# Patient Record
Sex: FEMALE | Race: WHITE | Employment: FULL TIME | URBAN - METROPOLITAN AREA
[De-identification: names, ages, dates, MRNs, and addresses within clinical notes are randomized per-mention and may not be internally consistent; named-entity substitution may affect disease eponyms.]

---

## 2021-02-19 ENCOUNTER — HOSPITAL ENCOUNTER (OUTPATIENT)
Dept: WOMENS IMAGING | Age: 41
Discharge: HOME | End: 2021-02-21
Payer: COMMERCIAL

## 2021-02-19 DIAGNOSIS — Z12.31 OTHER SCREENING MAMMOGRAM: ICD-10-CM

## 2021-02-19 PROCEDURE — 77067 SCR MAMMO BI INCL CAD: CPT

## 2023-09-15 LAB
6-ACETYLMORPHINE: <25 NG/ML
7-AMINOCLONAZEPAM: <25 NG/ML
ALPHA-HYDROXYALPRAZOLAM: <25 NG/ML
ALPHA-HYDROXYMIDAZOLAM: <25 NG/ML
ALPRAZOLAM: <25 NG/ML
AMPHETAMINE (PRESENCE) IN URINE BY SCREEN METHOD: ABNORMAL
BARBITURATES PRESENCE IN URINE BY SCREEN METHOD: ABNORMAL
CANNABINOIDS IN URINE BY SCREEN METHOD: ABNORMAL
CHLORDIAZEPOXIDE: <25 NG/ML
CLONAZEPAM: <25 NG/ML
COCAINE (PRESENCE) IN URINE BY SCREEN METHOD: ABNORMAL
CODEINE: <50 NG/ML
CREATINE, URINE FOR DRUG: 87.4 MG/DL
DIAZEPAM: <25 NG/ML
DRUG SCREEN COMMENT URINE: ABNORMAL
EDDP: <25 NG/ML
FENTANYL CONFIRMATION, URINE: <2.5 NG/ML
HYDROCODONE: <25 NG/ML
HYDROMORPHONE: <25 NG/ML
LORAZEPAM: <25 NG/ML
METHADONE CONFIRMATION,URINE: <25 NG/ML
MIDAZOLAM: <25 NG/ML
MORPHINE URINE: <50 NG/ML
NORDIAZEPAM: <25 NG/ML
NORFENTANYL: <2.5 NG/ML
NORHYDROCODONE: <25 NG/ML
NOROXYCODONE: <25 NG/ML
O-DESMETHYLTRAMADOL: <50 NG/ML
OXAZEPAM: <25 NG/ML
OXYCODONE: <25 NG/ML
OXYMORPHONE: <25 NG/ML
PHENCYCLIDINE (PRESENCE) IN URINE BY SCREEN METHOD: ABNORMAL
TEMAZEPAM: <25 NG/ML
TRAMADOL: <50 NG/ML
ZOLPIDEM METABOLITE (ZCA): <25 NG/ML
ZOLPIDEM: <25 NG/ML

## 2023-09-16 LAB
AMPHETAMINES,URINE: 3109 NG/ML
MDA,URINE: <200 NG/ML
MDEA,URINE: <200 NG/ML
MDMA,UR: <200 NG/ML
METHAMPHETAMINE QUANTITATIVE URINE: <200 NG/ML
PHENTERMINE,UR: <200 NG/ML

## 2023-10-20 ENCOUNTER — TELEPHONE (OUTPATIENT)
Dept: PRIMARY CARE | Facility: CLINIC | Age: 43
End: 2023-10-20
Payer: COMMERCIAL

## 2023-10-20 DIAGNOSIS — F90.9 ATTENTION DEFICIT HYPERACTIVITY DISORDER (ADHD), UNSPECIFIED ADHD TYPE: Primary | ICD-10-CM

## 2023-10-20 RX ORDER — DEXTROAMPHETAMINE SACCHARATE, AMPHETAMINE ASPARTATE MONOHYDRATE, DEXTROAMPHETAMINE SULFATE AND AMPHETAMINE SULFATE 6.25; 6.25; 6.25; 6.25 MG/1; MG/1; MG/1; MG/1
25 CAPSULE, EXTENDED RELEASE ORAL EVERY MORNING
Qty: 30 CAPSULE | Refills: 0 | Status: SHIPPED | OUTPATIENT
Start: 2023-11-19 | End: 2023-12-21 | Stop reason: SDUPTHER

## 2023-10-20 RX ORDER — DEXTROAMPHETAMINE SACCHARATE, AMPHETAMINE ASPARTATE MONOHYDRATE, DEXTROAMPHETAMINE SULFATE AND AMPHETAMINE SULFATE 6.25; 6.25; 6.25; 6.25 MG/1; MG/1; MG/1; MG/1
25 CAPSULE, EXTENDED RELEASE ORAL EVERY MORNING
Qty: 30 CAPSULE | Refills: 0 | Status: SHIPPED | OUTPATIENT
Start: 2023-10-20 | End: 2023-12-21 | Stop reason: WASHOUT

## 2023-10-20 NOTE — TELEPHONE ENCOUNTER
Rx Refill Request Telephone Encounter    Name:  Christine Miller  :  [id number]  Medication Name:  Adderall 25 mg   Specific Pharmacy location:   [facility]   Date of last appointment:  23  Date of next appointment:  doesn't have one   Best number to reach patient:    [phone]

## 2023-10-23 ENCOUNTER — TELEPHONE (OUTPATIENT)
Dept: PRIMARY CARE | Facility: CLINIC | Age: 43
End: 2023-10-23
Payer: COMMERCIAL

## 2023-10-23 NOTE — TELEPHONE ENCOUNTER
Rx Refill Request Telephone Encounter    Name:  Christine Miller  :  [id number]  Medication Name:  adderall  25mg  By mouth  90      Specific Pharmacy location:  [facility]  Date of last appointment:    Date of next appointment:  no appt  Best number to reach patient:  [phone]

## 2023-11-11 DIAGNOSIS — Z30.41 ORAL CONTRACEPTIVE PILL SURVEILLANCE: Primary | ICD-10-CM

## 2023-11-14 RX ORDER — DROSPIRENONE AND ETHINYL ESTRADIOL 0.02-3(28)
1 KIT ORAL DAILY
Qty: 84 TABLET | Refills: 0 | Status: SHIPPED | OUTPATIENT
Start: 2023-11-14 | End: 2024-02-16 | Stop reason: SDUPTHER

## 2023-11-20 ENCOUNTER — TELEPHONE (OUTPATIENT)
Dept: PRIMARY CARE | Facility: CLINIC | Age: 43
End: 2023-11-20
Payer: COMMERCIAL

## 2023-11-20 NOTE — TELEPHONE ENCOUNTER
Rx Refill Request Telephone Encounter    Name:  Christine Miller  :  [id number]  Medication Name:  Adderal  Specific Pharmacy location: [facility]  Date of last appointment:    Date of next appointment:    Best number to reach patient:

## 2023-12-17 DIAGNOSIS — E78.5 HYPERLIPIDEMIA, UNSPECIFIED HYPERLIPIDEMIA TYPE: ICD-10-CM

## 2023-12-17 DIAGNOSIS — F41.8 DEPRESSION WITH ANXIETY: ICD-10-CM

## 2023-12-17 DIAGNOSIS — I10 BENIGN ESSENTIAL HYPERTENSION: ICD-10-CM

## 2023-12-17 DIAGNOSIS — K21.9 GASTROESOPHAGEAL REFLUX DISEASE, UNSPECIFIED WHETHER ESOPHAGITIS PRESENT: ICD-10-CM

## 2023-12-19 RX ORDER — AMLODIPINE BESYLATE 5 MG/1
5 TABLET ORAL DAILY
Qty: 90 TABLET | Refills: 0 | Status: SHIPPED | OUTPATIENT
Start: 2023-12-19 | End: 2024-03-21

## 2023-12-19 RX ORDER — FLUOXETINE HYDROCHLORIDE 40 MG/1
80 CAPSULE ORAL DAILY
Qty: 180 CAPSULE | Refills: 0 | Status: SHIPPED | OUTPATIENT
Start: 2023-12-19 | End: 2024-03-21

## 2023-12-19 RX ORDER — PANTOPRAZOLE SODIUM 20 MG/1
20 TABLET, DELAYED RELEASE ORAL DAILY
Qty: 90 TABLET | Refills: 0 | Status: SHIPPED | OUTPATIENT
Start: 2023-12-19 | End: 2024-03-21

## 2023-12-19 RX ORDER — FENOFIBRATE 145 MG/1
145 TABLET, FILM COATED ORAL DAILY
Qty: 90 TABLET | Refills: 0 | Status: SHIPPED | OUTPATIENT
Start: 2023-12-19 | End: 2024-03-21

## 2023-12-21 ENCOUNTER — OFFICE VISIT (OUTPATIENT)
Dept: PRIMARY CARE | Facility: CLINIC | Age: 43
End: 2023-12-21
Payer: COMMERCIAL

## 2023-12-21 VITALS
SYSTOLIC BLOOD PRESSURE: 128 MMHG | HEIGHT: 64 IN | WEIGHT: 200 LBS | BODY MASS INDEX: 34.15 KG/M2 | DIASTOLIC BLOOD PRESSURE: 78 MMHG | TEMPERATURE: 98.1 F | HEART RATE: 89 BPM

## 2023-12-21 DIAGNOSIS — I10 BENIGN ESSENTIAL HYPERTENSION: ICD-10-CM

## 2023-12-21 DIAGNOSIS — F41.8 DEPRESSION WITH ANXIETY: ICD-10-CM

## 2023-12-21 DIAGNOSIS — F90.9 ATTENTION DEFICIT HYPERACTIVITY DISORDER (ADHD), UNSPECIFIED ADHD TYPE: ICD-10-CM

## 2023-12-21 DIAGNOSIS — F98.8 ATTENTION DEFICIT DISORDER, UNSPECIFIED HYPERACTIVITY PRESENCE: Primary | ICD-10-CM

## 2023-12-21 DIAGNOSIS — E78.2 MIXED HYPERLIPIDEMIA: ICD-10-CM

## 2023-12-21 DIAGNOSIS — R79.89 ABNORMAL TSH: ICD-10-CM

## 2023-12-21 DIAGNOSIS — E66.09 CLASS 1 OBESITY DUE TO EXCESS CALORIES WITH SERIOUS COMORBIDITY AND BODY MASS INDEX (BMI) OF 34.0 TO 34.9 IN ADULT: ICD-10-CM

## 2023-12-21 DIAGNOSIS — R73.9 HYPERGLYCEMIA: ICD-10-CM

## 2023-12-21 PROBLEM — N92.0 MENORRHAGIA: Status: ACTIVE | Noted: 2023-12-21

## 2023-12-21 PROBLEM — R53.83 FATIGUE: Status: ACTIVE | Noted: 2023-12-21

## 2023-12-21 PROBLEM — E88.810 METABOLIC SYNDROME X: Status: ACTIVE | Noted: 2023-12-21

## 2023-12-21 PROBLEM — J39.2 HYPERACTIVE GAG REFLEX: Status: ACTIVE | Noted: 2023-12-21

## 2023-12-21 PROBLEM — K21.9 ESOPHAGEAL REFLUX: Status: ACTIVE | Noted: 2023-12-21

## 2023-12-21 PROBLEM — E55.9 VITAMIN D DEFICIENCY: Status: ACTIVE | Noted: 2023-12-21

## 2023-12-21 PROBLEM — G47.33 OBSTRUCTIVE SLEEP APNEA (ADULT) (PEDIATRIC): Status: ACTIVE | Noted: 2021-08-09

## 2023-12-21 PROBLEM — R00.0 TACHYCARDIA: Status: ACTIVE | Noted: 2023-12-21

## 2023-12-21 PROBLEM — N92.6 IRREGULAR MENSTRUAL CYCLE: Status: ACTIVE | Noted: 2023-12-21

## 2023-12-21 PROBLEM — E28.2 POLYCYSTIC OVARY SYNDROME: Status: ACTIVE | Noted: 2020-05-12

## 2023-12-21 PROBLEM — J30.9 ACUTE ALLERGIC RHINITIS: Status: ACTIVE | Noted: 2023-12-21

## 2023-12-21 PROBLEM — E66.811 CLASS 1 OBESITY DUE TO EXCESS CALORIES WITH SERIOUS COMORBIDITY AND BODY MASS INDEX (BMI) OF 34.0 TO 34.9 IN ADULT: Status: ACTIVE | Noted: 2023-12-21

## 2023-12-21 PROBLEM — K62.5 RECTAL BLEEDING: Status: ACTIVE | Noted: 2023-12-21

## 2023-12-21 PROBLEM — U07.1 COVID-19: Status: RESOLVED | Noted: 2023-12-21 | Resolved: 2023-12-21

## 2023-12-21 PROCEDURE — 1036F TOBACCO NON-USER: CPT | Performed by: FAMILY MEDICINE

## 2023-12-21 PROCEDURE — 99214 OFFICE O/P EST MOD 30 MIN: CPT | Performed by: FAMILY MEDICINE

## 2023-12-21 PROCEDURE — 3074F SYST BP LT 130 MM HG: CPT | Performed by: FAMILY MEDICINE

## 2023-12-21 PROCEDURE — 3008F BODY MASS INDEX DOCD: CPT | Performed by: FAMILY MEDICINE

## 2023-12-21 PROCEDURE — 3078F DIAST BP <80 MM HG: CPT | Performed by: FAMILY MEDICINE

## 2023-12-21 RX ORDER — DEXTROAMPHETAMINE SACCHARATE, AMPHETAMINE ASPARTATE MONOHYDRATE, DEXTROAMPHETAMINE SULFATE AND AMPHETAMINE SULFATE 6.25; 6.25; 6.25; 6.25 MG/1; MG/1; MG/1; MG/1
25 CAPSULE, EXTENDED RELEASE ORAL EVERY MORNING
Qty: 30 CAPSULE | Refills: 0 | Status: SHIPPED | OUTPATIENT
Start: 2023-12-21 | End: 2024-01-25 | Stop reason: SDUPTHER

## 2023-12-21 ASSESSMENT — PATIENT HEALTH QUESTIONNAIRE - PHQ9
1. LITTLE INTEREST OR PLEASURE IN DOING THINGS: NOT AT ALL
SUM OF ALL RESPONSES TO PHQ9 QUESTIONS 1 AND 2: 0
2. FEELING DOWN, DEPRESSED OR HOPELESS: NOT AT ALL

## 2023-12-21 NOTE — ASSESSMENT & PLAN NOTE
Blood pressure controlled.  Continue with current dose of amlodipine.  Check blood work as ordered.

## 2023-12-21 NOTE — PROGRESS NOTES
Subjective   Patient ID: Christine Miller is a 43 y.o. female who presents for Med Refill.  She reports that overall she has been doing well.  She she has continue to work on eating healthy and staying active since her bariatric surgery.  She states that she has been feeling well.  She is requesting a refill on her Adderall.  Reports concentration and focus have been good with her current dose.  She denies any headaches or dizziness.  No chest pain or shortness of breath or abdominal pain.  She reports that her mood has been good.  She denies any other concerns.  Med Refill      Social History     Socioeconomic History    Marital status: Single     Spouse name: Not on file    Number of children: Not on file    Years of education: Not on file    Highest education level: Not on file   Occupational History    Not on file   Tobacco Use    Smoking status: Never    Smokeless tobacco: Never   Substance and Sexual Activity    Alcohol use: Not Currently    Drug use: Not Currently    Sexual activity: Not on file   Other Topics Concern    Not on file   Social History Narrative    Not on file     Social Determinants of Health     Financial Resource Strain: Not on file   Food Insecurity: Not on file   Transportation Needs: Not on file   Physical Activity: Not on file   Stress: Not on file   Social Connections: Not on file   Intimate Partner Violence: Not on file   Housing Stability: Not on file     Current Outpatient Medications   Medication Sig Dispense Refill    amLODIPine (Norvasc) 5 mg tablet TAKE 1 TABLET BY MOUTH DAILY AS DIRECTED 90 tablet 0    drospirenone-ethinyl estradioL (Heidi, Gianvi) 3-0.02 mg tablet TAKE 1 TABLET BY MOUTH DAILY 84 tablet 0    fenofibrate (Tricor) 145 mg tablet TAKE 1 TABLET BY MOUTH EVERY DAY 90 tablet 0    FLUoxetine (PROzac) 40 mg capsule TAKE 2 CAPSULES BY MOUTH DAILY 180 capsule 0    pantoprazole (ProtoNix) 20 mg EC tablet TAKE 1 TABLET BY MOUTH DAILY 90 tablet 0    amphetamine-dextroamphetamine  "XR (Adderall XR) 25 mg 24 hr capsule Take 1 capsule (25 mg) by mouth once daily in the morning. Do not crush or chew. 30 capsule 0     No current facility-administered medications for this visit.     Family History   Problem Relation Name Age of Onset    Breast cancer Mother       Review of Systems    Review of Systems negative except as noted in HPI and Chief complaint.     Objective                 /78   Pulse 89   Temp 36.7 °C (98.1 °F)   Ht 1.626 m (5' 4\")   Wt 90.7 kg (200 lb)   BMI 34.33 kg/m²    Physical Exam  Vitals reviewed.   HENT:      Head: Normocephalic and atraumatic.      Right Ear: Tympanic membrane normal.      Left Ear: Tympanic membrane normal.      Nose: Nose normal.   Eyes:      Extraocular Movements: Extraocular movements intact.      Conjunctiva/sclera: Conjunctivae normal.      Pupils: Pupils are equal, round, and reactive to light.   Cardiovascular:      Rate and Rhythm: Normal rate and regular rhythm.      Pulses: Normal pulses.   Pulmonary:      Effort: Pulmonary effort is normal.      Breath sounds: Normal breath sounds.   Abdominal:      General: There is no distension.      Palpations: Abdomen is soft.      Tenderness: There is no abdominal tenderness.   Musculoskeletal:         General: Normal range of motion.      Cervical back: Normal range of motion and neck supple.   Lymphadenopathy:      Cervical: No cervical adenopathy.   Neurological:      General: No focal deficit present.      Mental Status: She is alert.       No results found for this or any previous visit (from the past 96 hour(s)).    Assessment/Plan   Problem List Items Addressed This Visit       Abnormal TSH     Recommend rechecking TSH.         Relevant Orders    Tsh With Reflex To Free T4 If Abnormal    ADD (attention deficit disorder) - Primary     Symptoms well-controlled with current dose of Adderall.  [place]ARRS was reviewed.  Controlled substance agreement signed today.  Urine tox screen was done in " September.  Follow-up in 3 months or sooner with any concerns.         Relevant Medications    amphetamine-dextroamphetamine XR (Adderall XR) 25 mg 24 hr capsule    Benign essential hypertension     Blood pressure controlled.  Continue with current dose of amlodipine.  Check blood work as ordered.         Relevant Orders    CBC and Auto Differential    Comprehensive Metabolic Panel    Albumin , Urine Random    Vitamin B12    Depression with anxiety     Stable.  Symptoms well-controlled with current dose of fluoxetine.         Hyperglycemia     Continue to work on healthy diet and exercise.  Check CMP and hemoglobin A1c.         Relevant Orders    Hemoglobin A1C    Vitamin B12    Mixed hyperlipidemia     Continue to work on healthy diet and exercise.  Check CMP and lipids.         Relevant Orders    Lipid Panel    Class 1 obesity due to excess calories with serious comorbidity and body mass index (BMI) of 34.0 to 34.9 in adult

## 2023-12-21 NOTE — ASSESSMENT & PLAN NOTE
Symptoms well-controlled with current dose of Adderall.  OARRS was reviewed.  Controlled substance agreement signed today.  Urine tox screen was done in September.  Follow-up in 3 months or sooner with any concerns.

## 2024-01-25 DIAGNOSIS — F90.9 ATTENTION DEFICIT HYPERACTIVITY DISORDER (ADHD), UNSPECIFIED ADHD TYPE: ICD-10-CM

## 2024-01-25 RX ORDER — DEXTROAMPHETAMINE SACCHARATE, AMPHETAMINE ASPARTATE MONOHYDRATE, DEXTROAMPHETAMINE SULFATE AND AMPHETAMINE SULFATE 6.25; 6.25; 6.25; 6.25 MG/1; MG/1; MG/1; MG/1
25 CAPSULE, EXTENDED RELEASE ORAL EVERY MORNING
Qty: 30 CAPSULE | Refills: 0 | Status: SHIPPED | OUTPATIENT
Start: 2024-01-25 | End: 2024-02-27 | Stop reason: SDUPTHER

## 2024-01-25 NOTE — TELEPHONE ENCOUNTER
Rx Refill Request Telephone Encounter    Name:  Christine Miller  :  [id number]  Medication Name:      amphetamine-dextroamphetamine XR (Adderall XR) 25 mg 24 hr capsule       Specific Pharmacy location:    [id number] -  [address], [facility] [facility] [address]  Phone: [phone]  Fax: [phone]     Date of last appointment:    2023    Date of next appointment:    N/a

## 2024-02-16 DIAGNOSIS — Z30.41 ORAL CONTRACEPTIVE PILL SURVEILLANCE: ICD-10-CM

## 2024-02-16 RX ORDER — DROSPIRENONE AND ETHINYL ESTRADIOL 0.02-3(28)
1 KIT ORAL DAILY
Qty: 30 TABLET | Refills: 2 | Status: SHIPPED | OUTPATIENT
Start: 2024-02-16 | End: 2024-05-21

## 2024-02-27 ENCOUNTER — TELEPHONE (OUTPATIENT)
Dept: PRIMARY CARE | Facility: CLINIC | Age: 44
End: 2024-02-27
Payer: COMMERCIAL

## 2024-02-27 DIAGNOSIS — F90.9 ATTENTION DEFICIT HYPERACTIVITY DISORDER (ADHD), UNSPECIFIED ADHD TYPE: ICD-10-CM

## 2024-02-27 RX ORDER — DEXTROAMPHETAMINE SACCHARATE, AMPHETAMINE ASPARTATE MONOHYDRATE, DEXTROAMPHETAMINE SULFATE AND AMPHETAMINE SULFATE 6.25; 6.25; 6.25; 6.25 MG/1; MG/1; MG/1; MG/1
25 CAPSULE, EXTENDED RELEASE ORAL EVERY MORNING
Qty: 30 CAPSULE | Refills: 0 | Status: SHIPPED | OUTPATIENT
Start: 2024-02-27 | End: 2024-03-05 | Stop reason: SDUPTHER

## 2024-02-27 NOTE — TELEPHONE ENCOUNTER
Pt requesting rx refill for adderall XR 25mg every day to [facility]. Pt has pending 3/5/24 but will run out before her pending appointment. Can this be done?

## 2024-02-27 NOTE — TELEPHONE ENCOUNTER
T/c to pt, spoke with pt, informed pt that her Adderall was sent over to the pharmacy. Pt verbalized her understanding

## 2024-03-05 ENCOUNTER — OFFICE VISIT (OUTPATIENT)
Dept: PRIMARY CARE | Facility: CLINIC | Age: 44
End: 2024-03-05
Payer: COMMERCIAL

## 2024-03-05 VITALS
TEMPERATURE: 96.8 F | SYSTOLIC BLOOD PRESSURE: 123 MMHG | WEIGHT: 200.8 LBS | HEIGHT: 64 IN | HEART RATE: 86 BPM | BODY MASS INDEX: 34.28 KG/M2 | DIASTOLIC BLOOD PRESSURE: 80 MMHG

## 2024-03-05 DIAGNOSIS — F98.8 ATTENTION DEFICIT DISORDER, UNSPECIFIED HYPERACTIVITY PRESENCE: Primary | ICD-10-CM

## 2024-03-05 DIAGNOSIS — M25.552 LEFT HIP PAIN: ICD-10-CM

## 2024-03-05 DIAGNOSIS — E66.09 CLASS 1 OBESITY DUE TO EXCESS CALORIES WITH SERIOUS COMORBIDITY AND BODY MASS INDEX (BMI) OF 34.0 TO 34.9 IN ADULT: ICD-10-CM

## 2024-03-05 DIAGNOSIS — N92.6 IRREGULAR MENSTRUAL CYCLE: ICD-10-CM

## 2024-03-05 DIAGNOSIS — F90.9 ATTENTION DEFICIT HYPERACTIVITY DISORDER (ADHD), UNSPECIFIED ADHD TYPE: ICD-10-CM

## 2024-03-05 DIAGNOSIS — I10 BENIGN ESSENTIAL HYPERTENSION: ICD-10-CM

## 2024-03-05 DIAGNOSIS — R73.9 HYPERGLYCEMIA: ICD-10-CM

## 2024-03-05 DIAGNOSIS — F41.8 DEPRESSION WITH ANXIETY: ICD-10-CM

## 2024-03-05 PROCEDURE — 3008F BODY MASS INDEX DOCD: CPT | Performed by: FAMILY MEDICINE

## 2024-03-05 PROCEDURE — 3074F SYST BP LT 130 MM HG: CPT | Performed by: FAMILY MEDICINE

## 2024-03-05 PROCEDURE — 99214 OFFICE O/P EST MOD 30 MIN: CPT | Performed by: FAMILY MEDICINE

## 2024-03-05 PROCEDURE — 3079F DIAST BP 80-89 MM HG: CPT | Performed by: FAMILY MEDICINE

## 2024-03-05 PROCEDURE — 1036F TOBACCO NON-USER: CPT | Performed by: FAMILY MEDICINE

## 2024-03-05 RX ORDER — DEXTROAMPHETAMINE SACCHARATE, AMPHETAMINE ASPARTATE MONOHYDRATE, DEXTROAMPHETAMINE SULFATE AND AMPHETAMINE SULFATE 6.25; 6.25; 6.25; 6.25 MG/1; MG/1; MG/1; MG/1
25 CAPSULE, EXTENDED RELEASE ORAL EVERY MORNING
Qty: 30 CAPSULE | Refills: 0 | Status: SHIPPED | OUTPATIENT
Start: 2024-03-05 | End: 2024-04-29 | Stop reason: SDUPTHER

## 2024-03-05 NOTE — ASSESSMENT & PLAN NOTE
No abnormality on exam.  Recommend x-ray or orthopedic referral which patient declined.  She will follow-up if symptoms persist.

## 2024-03-05 NOTE — PROGRESS NOTES
Subjective   Patient ID: Christine Miller is a 43 y.o. female who presents for Follow-up (Medication follow-up): Patient presents today for follow-up on her chronic medical problems.  She reports that overall she has been feeling well.  She is continue to work on eating healthy.  She states she has been exercising as much.  She has had some pain on her left hip.  She denies any specific injury.  She states that concentration and focus have been good with her current dose of Adderall.  She still has not had her blood work done.  She continues to have declined Pap smear or GYN referral.  She denies any other concerns.     Past Surgical History:   Procedure Laterality Date    OTHER SURGICAL HISTORY  06/22/2022    Bariatric surgery    OTHER SURGICAL HISTORY  03/29/2022    Gastric bypass surgery      Family History   Problem Relation Name Age of Onset    Breast cancer Mother        Social History     Socioeconomic History    Marital status: Single     Spouse name: Not on file    Number of children: Not on file    Years of education: Not on file    Highest education level: Not on file   Occupational History    Not on file   Tobacco Use    Smoking status: Never    Smokeless tobacco: Never   Substance and Sexual Activity    Alcohol use: Not Currently    Drug use: Not Currently    Sexual activity: Not on file   Other Topics Concern    Not on file   Social History Narrative    Not on file     Social Determinants of Health     Financial Resource Strain: Not on file   Food Insecurity: Not on file   Transportation Needs: Not on file   Physical Activity: Not on file   Stress: Not on file   Social Connections: Not on file   Intimate Partner Violence: Not on file   Housing Stability: Not on file      Patient has no known allergies.   Current Outpatient Medications   Medication Sig Dispense Refill    amLODIPine (Norvasc) 5 mg tablet TAKE 1 TABLET BY MOUTH DAILY AS DIRECTED 90 tablet 0    drospirenone-ethinyl estradioL (Heidi, Gianvi)  3-0.02 mg tablet TAKE 1 TABLET BY MOUTH DAILY 30 tablet 2    fenofibrate (Tricor) 145 mg tablet TAKE 1 TABLET BY MOUTH EVERY DAY 90 tablet 0    FLUoxetine (PROzac) 40 mg capsule TAKE 2 CAPSULES BY MOUTH DAILY 180 capsule 0    pantoprazole (ProtoNix) 20 mg EC tablet TAKE 1 TABLET BY MOUTH DAILY 90 tablet 0    amphetamine-dextroamphetamine XR (Adderall XR) 25 mg 24 hr capsule Take 1 capsule (25 mg) by mouth once daily in the morning. Do not crush or chew. 30 capsule 0     No current facility-administered medications for this visit.       Immunization History   Administered Date(s) Administered    Flu vaccine (IIV4), preservative free *Check age/dose* 12/29/2016, 09/24/2018, 10/23/2019, 12/01/2019, 11/19/2020, 11/02/2021    Flu vaccine, quadrivalent, no egg protein, age 6 month or greater (FLUCELVAX) 08/10/2017, 09/22/2022    Influenza, Unspecified 09/08/2015    Influenza, injectable, quadrivalent 10/01/2018    Influenza, seasonal, intradermal, preservative free 10/03/2012, 11/08/2013, 10/01/2017    Moderna SARS-CoV-2 Vaccination 11/02/2021    Pfizer COVID-19 vaccine, bivalent, age 12 years and older (30 mcg/0.3 mL) 09/22/2022    Pfizer Purple Cap SARS-CoV-2 01/15/2021, 02/05/2021        Review of Systems     Vitals:    03/05/24 1109   BP: 123/80   Pulse: 86   Temp: 36 °C (96.8 °F)       Physical Exam  Vitals reviewed.   HENT:      Head: Normocephalic and atraumatic.      Right Ear: Tympanic membrane normal.      Left Ear: Tympanic membrane normal.      Nose: Nose normal.   Eyes:      Extraocular Movements: Extraocular movements intact.      Conjunctiva/sclera: Conjunctivae normal.      Pupils: Pupils are equal, round, and reactive to light.   Cardiovascular:      Rate and Rhythm: Normal rate and regular rhythm.      Pulses: Normal pulses.   Pulmonary:      Effort: Pulmonary effort is normal.      Breath sounds: Normal breath sounds.   Abdominal:      General: There is no distension.      Palpations: Abdomen is soft.       Tenderness: There is no abdominal tenderness.   Musculoskeletal:         General: Normal range of motion.      Cervical back: Normal range of motion and neck supple.      Right lower leg: No edema.      Left lower leg: No edema.   Lymphadenopathy:      Cervical: No cervical adenopathy.   Neurological:      General: No focal deficit present.      Mental Status: She is alert.          @labresults@    Assessment/Plan     Problem List Items Addressed This Visit       ADD (attention deficit disorder) - Primary    Current Assessment & Plan     Symptoms well-controlled with current dose of Adderall.  OARRS was reviewed.  Controlled substance agreement signed in December.  Urine tox screen done in September.         Relevant Medications    amphetamine-dextroamphetamine XR (Adderall XR) 25 mg 24 hr capsule    Benign essential hypertension    Current Assessment & Plan     Blood pressure well-controlled.  Continue with amlodipine.  Proceed with blood work as previously ordered.         Depression with anxiety    Current Assessment & Plan     Symptoms well-controlled with current dose of fluoxetine.         Hyperglycemia    Current Assessment & Plan     Continue with healthy diet and exercise.  Proceed with CMP and hemoglobin A1c as previously ordered         Irregular menstrual cycle    Current Assessment & Plan     These have improved on birth control pills.  Again strongly recommend she proceed with Pap smear for cervical cancer screening and GYN referral.  Patient declines.         Class 1 obesity due to excess calories with serious comorbidity and body mass index (BMI) of 34.0 to 34.9 in adult    Left hip pain    Current Assessment & Plan     No abnormality on exam.  Recommend x-ray or orthopedic referral which patient declined.  She will follow-up if symptoms persist.

## 2024-03-05 NOTE — ASSESSMENT & PLAN NOTE
These have improved on birth control pills.  Again strongly recommend she proceed with Pap smear for cervical cancer screening and GYN referral.  Patient declines.

## 2024-03-05 NOTE — ASSESSMENT & PLAN NOTE
Symptoms well-controlled with current dose of Adderall.  OARRS was reviewed.  Controlled substance agreement signed in December.  Urine tox screen done in September.

## 2024-03-05 NOTE — ASSESSMENT & PLAN NOTE
Continue with healthy diet and exercise.  Proceed with CMP and hemoglobin A1c as previously ordered

## 2024-03-05 NOTE — ASSESSMENT & PLAN NOTE
Blood pressure well-controlled.  Continue with amlodipine.  Proceed with blood work as previously ordered.

## 2024-03-21 DIAGNOSIS — F41.8 DEPRESSION WITH ANXIETY: ICD-10-CM

## 2024-03-21 DIAGNOSIS — E78.5 HYPERLIPIDEMIA, UNSPECIFIED HYPERLIPIDEMIA TYPE: ICD-10-CM

## 2024-03-21 DIAGNOSIS — K21.9 GASTROESOPHAGEAL REFLUX DISEASE, UNSPECIFIED WHETHER ESOPHAGITIS PRESENT: ICD-10-CM

## 2024-03-21 DIAGNOSIS — I10 BENIGN ESSENTIAL HYPERTENSION: ICD-10-CM

## 2024-03-21 RX ORDER — FLUOXETINE HYDROCHLORIDE 40 MG/1
80 CAPSULE ORAL DAILY
Qty: 180 CAPSULE | Refills: 1 | Status: SHIPPED | OUTPATIENT
Start: 2024-03-21

## 2024-03-21 RX ORDER — AMLODIPINE BESYLATE 5 MG/1
5 TABLET ORAL DAILY
Qty: 90 TABLET | Refills: 1 | Status: SHIPPED | OUTPATIENT
Start: 2024-03-21

## 2024-03-21 RX ORDER — PANTOPRAZOLE SODIUM 20 MG/1
20 TABLET, DELAYED RELEASE ORAL DAILY
Qty: 90 TABLET | Refills: 1 | Status: SHIPPED | OUTPATIENT
Start: 2024-03-21

## 2024-03-21 RX ORDER — FENOFIBRATE 145 MG/1
145 TABLET, FILM COATED ORAL DAILY
Qty: 90 TABLET | Refills: 1 | Status: SHIPPED | OUTPATIENT
Start: 2024-03-21

## 2024-03-22 ENCOUNTER — OFFICE VISIT (OUTPATIENT)
Dept: PRIMARY CARE | Facility: CLINIC | Age: 44
End: 2024-03-22
Payer: COMMERCIAL

## 2024-03-22 ENCOUNTER — HOSPITAL ENCOUNTER (OUTPATIENT)
Dept: RADIOLOGY | Facility: HOSPITAL | Age: 44
Discharge: HOME | End: 2024-03-22
Payer: COMMERCIAL

## 2024-03-22 ENCOUNTER — TELEPHONE (OUTPATIENT)
Dept: PRIMARY CARE | Facility: CLINIC | Age: 44
End: 2024-03-22
Payer: COMMERCIAL

## 2024-03-22 VITALS
SYSTOLIC BLOOD PRESSURE: 124 MMHG | HEART RATE: 98 BPM | BODY MASS INDEX: 34.04 KG/M2 | DIASTOLIC BLOOD PRESSURE: 77 MMHG | HEIGHT: 64 IN | WEIGHT: 199.4 LBS

## 2024-03-22 DIAGNOSIS — M79.669 CALF PAIN, UNSPECIFIED LATERALITY: ICD-10-CM

## 2024-03-22 DIAGNOSIS — M79.89 LEG SWELLING: Primary | ICD-10-CM

## 2024-03-22 DIAGNOSIS — M79.89 LEG SWELLING: ICD-10-CM

## 2024-03-22 DIAGNOSIS — E66.09 CLASS 1 OBESITY DUE TO EXCESS CALORIES WITH SERIOUS COMORBIDITY AND BODY MASS INDEX (BMI) OF 34.0 TO 34.9 IN ADULT: ICD-10-CM

## 2024-03-22 DIAGNOSIS — I10 BENIGN ESSENTIAL HYPERTENSION: ICD-10-CM

## 2024-03-22 DIAGNOSIS — E78.2 MIXED HYPERLIPIDEMIA: ICD-10-CM

## 2024-03-22 PROCEDURE — 3008F BODY MASS INDEX DOCD: CPT | Performed by: INTERNAL MEDICINE

## 2024-03-22 PROCEDURE — 99215 OFFICE O/P EST HI 40 MIN: CPT | Performed by: INTERNAL MEDICINE

## 2024-03-22 PROCEDURE — 3074F SYST BP LT 130 MM HG: CPT | Performed by: INTERNAL MEDICINE

## 2024-03-22 PROCEDURE — 73564 X-RAY EXAM KNEE 4 OR MORE: CPT | Mod: LEFT SIDE | Performed by: RADIOLOGY

## 2024-03-22 PROCEDURE — 93971 EXTREMITY STUDY: CPT | Performed by: RADIOLOGY

## 2024-03-22 PROCEDURE — 73564 X-RAY EXAM KNEE 4 OR MORE: CPT | Mod: LT

## 2024-03-22 PROCEDURE — 1036F TOBACCO NON-USER: CPT | Performed by: INTERNAL MEDICINE

## 2024-03-22 PROCEDURE — 3078F DIAST BP <80 MM HG: CPT | Performed by: INTERNAL MEDICINE

## 2024-03-22 PROCEDURE — 93971 EXTREMITY STUDY: CPT

## 2024-03-22 NOTE — PROGRESS NOTES
"Subjective   Patient ID: Christine Miller is a 43 y.o. female who presents for left upper leg bump, hard and painful  x yesterday.    HPI Pt is a pleasant 43 y.o CF who was seen and evaluated during the acute OV with 1 day hx of the non radiating left calf pain and swelling. Pt denies any hx of trauma, pt did not travel recently. Pt denies any personal or Fhx of blood clots. Pt did not rate this pain for me, but states that is \"really painful\".  Pt also takes OCP. During the clinical encounter pt denies fever, chills, no SOB, no chest pain/tightness, no abdominal pain, no N/V/D reported, no change of urination reported. Pt denies any other health concerns during this visit.  Sohx: reviewed  PMHx and Fhx: reviewed    Review of Systems  12 Systems have been reviewed as follows:   Constitutional: no fever, no chills  Eyes: no eyesight problems, no eye redness, no eye pain, no blurred vision  ENT: no ear pain or sore throat, no nasal discharge or congestion, no sinus pressure, no hearing loss  Cardiovascular: no chest pain or tightness, no SOB, the heart rate was not fast or slow  Respiratory: no cough, no dyspnea with exertion or with rest, no wheezing  Gastrointestinal: no abdominal pain, no constipation or diarrhea, no heartburn, no nausea or vomiting  Genitourinary: no urine frequency, no dysuria, no urinary urgency, no urinary incontinence or retention  Musculoskeletal: no back pain, but as mentioned at HPI  Integumentary: no skin lesions or rash  Neurological: no headaches, no dizziness or fainting, no limb weakness  Psychiatric: no mood changes, no anxiety or depression, no SI/HI  Endocrine: no weight change, no temperature intolerance, no change of appetite  Hematologic/Lymphatic: no easy bruising or bleeding  Objective   /77   Pulse 98   Ht 1.626 m (5' 4\")   Wt 90.4 kg (199 lb 6.4 oz)   BMI 34.23 kg/m²     Physical Exam  Constitutional: well hydrated, well nourished, no acute distress. Vital signs " reviewed  Head and face: normocephalic, atraumatic  Eyes: no erythema, edema or visible abnormalities of conjunctiva or lids. Pupils are equal, round and reactive to light. Extraocular movement normal  ENT: external ears without deformities  Neck: full ROM, no adenopathy, neck was supple  Pulmonary: normal respiratory rate and effort. Lungs are clear to auscultation, no rales,no rhonchi or wheezing  Cardiovascular: RRR, normal S1 and S2, no murmur, no gallop, posterior tib. pulse normal 2+ bilaterally, no lower extremity edema  Abdomen: soft, non tender on palpation, not distended, normal BS in all 4 quadrants  Musculoskeletal: no joint swelling, normal movements of all 4 extremities. Gait and station: normal, Digits and nails: normal without clubbing. The left extremity exam was significant for the pain on palpation along the superior posterior surface of the left leg, some edema with no skin erythema noticed. No fluctuation to palpation. The knees joints exam was done in comparison and was unremarkable  Skin: normal color, no rash  Neurologic: Cranial nerves: CN II: visual acuity intact. Source: acuity reported by patient. Pupils reactive to light with normal accommodation. Right and left pupil normal CN III, IV and VI: the EO movements were intact. CN VIII: no hearing loss. Deep tendon reflexes: were 2+ and symmetric:  R and L patella.  Sensory exam: normal light to touch  Psychiatric: Mood and affect: normal, Alert and Oriented x 3  Lymphatic: no cervical lymphadenopathy    Assessment/Plan   Left calf swelling and pain:  Hx and PE as mentioned  Wells score for DVT 1 point  Will order Dupp US of the lower extrem STAT. XR of the left knee STAT.  Pt denies any chance of pregnancy  Pt was instructed to use tylenol for pain/fever management  Pt was instructed to contact PCP if pt will have no improvement of the condition/worsening of the sxs/new sxs  Pt was instructed to call 911/ go to ER if will have SOB/Chest  pain/tightness/drowsiness/skin redness/fever  Plan was d/c with the pt in details, verbalized understanding, agreed    HLD: on fenofibrate 145 mg PO daily  HTN: on Amlodipine 5 mg Po daily  BMI of 34: pt will be beneficial form the lifestyle modifications  Please follow up with PCP as planned or sooner if needed

## 2024-03-22 NOTE — TELEPHONE ENCOUNTER
Patient is scheduled to see you next at the end of June. She is concerned about not being able to get her adderall for may as there were no appointments available with you at the end of may. Will she be OK with the June appointment or is there a date in may that you would like her added for continued refill of this medication?

## 2024-03-25 ENCOUNTER — TELEPHONE (OUTPATIENT)
Dept: PRIMARY CARE | Facility: CLINIC | Age: 44
End: 2024-03-25
Payer: COMMERCIAL

## 2024-03-25 NOTE — TELEPHONE ENCOUNTER
----- Message from Nena Morel MD sent at 3/25/2024  8:27 AM EDT -----  The LL duplex showed fluid collection, which may represent a  hematoma. No blood clots. I would recommend the pt to see the ortho team. Please, let me know if the pt would be interested.

## 2024-03-25 NOTE — TELEPHONE ENCOUNTER
----- Message from Nena Morel MD sent at 3/25/2024  8:25 AM EDT -----  Please, inform the pt that the recent knee XR showed: Moderate-to-severe tricompartment osteoarthritis. Please, contact to us if the sxs will persistent or getting worse.

## 2024-04-29 DIAGNOSIS — F90.9 ATTENTION DEFICIT HYPERACTIVITY DISORDER (ADHD), UNSPECIFIED ADHD TYPE: ICD-10-CM

## 2024-04-29 RX ORDER — DEXTROAMPHETAMINE SACCHARATE, AMPHETAMINE ASPARTATE MONOHYDRATE, DEXTROAMPHETAMINE SULFATE AND AMPHETAMINE SULFATE 6.25; 6.25; 6.25; 6.25 MG/1; MG/1; MG/1; MG/1
25 CAPSULE, EXTENDED RELEASE ORAL EVERY MORNING
Qty: 30 CAPSULE | Refills: 0 | Status: SHIPPED | OUTPATIENT
Start: 2024-04-29 | End: 2024-06-04 | Stop reason: SDUPTHER

## 2024-04-29 NOTE — TELEPHONE ENCOUNTER
Rx Refill Request Telephone Encounter    Name:  Christine Miller  :  [id number]  Medication Name:  amphetamine-dextroamphetamine XR (Adderall XR) 25 mg 24 hr capsule     Specific Pharmacy location: [id number] - [place], [place] - [address] AT [facility] [address]  [address], [facility] [address]  Phone: [phone]  Fax: [phone]      Date of last appointment:  2024    Date of next appointment:  2024    Best number to reach patient:  [phone]

## 2024-05-21 DIAGNOSIS — Z30.41 ORAL CONTRACEPTIVE PILL SURVEILLANCE: ICD-10-CM

## 2024-05-21 RX ORDER — DROSPIRENONE AND ETHINYL ESTRADIOL 0.02-3(28)
1 KIT ORAL DAILY
Qty: 28 TABLET | Refills: 2 | Status: SHIPPED | OUTPATIENT
Start: 2024-05-21 | End: 2024-08-13

## 2024-05-22 ENCOUNTER — APPOINTMENT (OUTPATIENT)
Dept: PRIMARY CARE | Facility: CLINIC | Age: 44
End: 2024-05-22
Payer: COMMERCIAL

## 2024-06-04 DIAGNOSIS — F90.9 ATTENTION DEFICIT HYPERACTIVITY DISORDER (ADHD), UNSPECIFIED ADHD TYPE: ICD-10-CM

## 2024-06-04 RX ORDER — DEXTROAMPHETAMINE SACCHARATE, AMPHETAMINE ASPARTATE MONOHYDRATE, DEXTROAMPHETAMINE SULFATE AND AMPHETAMINE SULFATE 6.25; 6.25; 6.25; 6.25 MG/1; MG/1; MG/1; MG/1
25 CAPSULE, EXTENDED RELEASE ORAL EVERY MORNING
Qty: 30 CAPSULE | Refills: 0 | Status: SHIPPED | OUTPATIENT
Start: 2024-06-04 | End: 2024-07-04

## 2024-06-04 NOTE — TELEPHONE ENCOUNTER
Rx Refill Request Telephone Encounter     Name:  Christine Miller  :  [id number]  Medication Name:  amphetamine-dextroamphetamine XR (Adderall XR) 25 mg 24 hr capsule      Specific Pharmacy location: [facility] #[id number] - [place], [place] - [address] AT [facility] [address]  [address], [facility] [address]  Phone: [phone]  Fax: [phone]       Date of last appointment:  2024     Date of next appointment:  2024     Best number to reach patient:  [phone]

## 2024-06-27 ENCOUNTER — APPOINTMENT (OUTPATIENT)
Dept: PRIMARY CARE | Facility: CLINIC | Age: 44
End: 2024-06-27
Payer: COMMERCIAL

## 2024-06-27 VITALS
TEMPERATURE: 98.1 F | BODY MASS INDEX: 34.25 KG/M2 | SYSTOLIC BLOOD PRESSURE: 121 MMHG | DIASTOLIC BLOOD PRESSURE: 78 MMHG | WEIGHT: 200.6 LBS | HEIGHT: 64 IN | HEART RATE: 85 BPM

## 2024-06-27 DIAGNOSIS — F41.8 DEPRESSION WITH ANXIETY: ICD-10-CM

## 2024-06-27 DIAGNOSIS — Z00.00 HEALTHCARE MAINTENANCE: ICD-10-CM

## 2024-06-27 DIAGNOSIS — E66.09 CLASS 1 OBESITY DUE TO EXCESS CALORIES WITH SERIOUS COMORBIDITY AND BODY MASS INDEX (BMI) OF 34.0 TO 34.9 IN ADULT: ICD-10-CM

## 2024-06-27 DIAGNOSIS — F98.8 ATTENTION DEFICIT DISORDER, UNSPECIFIED HYPERACTIVITY PRESENCE: Primary | ICD-10-CM

## 2024-06-27 DIAGNOSIS — I10 BENIGN ESSENTIAL HYPERTENSION: ICD-10-CM

## 2024-06-27 PROBLEM — M25.512 PAIN OF LEFT SHOULDER REGION: Status: ACTIVE | Noted: 2024-06-27

## 2024-06-27 PROBLEM — B37.2 CANDIDIASIS OF SKIN: Status: ACTIVE | Noted: 2024-06-27

## 2024-06-27 PROBLEM — M25.529 ELBOW PAIN: Status: ACTIVE | Noted: 2024-06-27

## 2024-06-27 PROBLEM — H61.20 IMPACTED CERUMEN: Status: ACTIVE | Noted: 2024-06-27

## 2024-06-27 PROBLEM — K59.00 ACUTE CONSTIPATION: Status: ACTIVE | Noted: 2024-06-27

## 2024-06-27 LAB
AMPHETAMINES UR QL SCN: ABNORMAL
BARBITURATES UR QL SCN: ABNORMAL
BZE UR QL SCN: ABNORMAL
CANNABINOIDS UR QL SCN: ABNORMAL
CREAT UR-MCNC: 57.5 MG/DL (ref 20–320)
PCP UR QL SCN: ABNORMAL

## 2024-06-27 PROCEDURE — 82570 ASSAY OF URINE CREATININE: CPT

## 2024-06-27 PROCEDURE — 80361 OPIATES 1 OR MORE: CPT

## 2024-06-27 PROCEDURE — 80307 DRUG TEST PRSMV CHEM ANLYZR: CPT

## 2024-06-27 PROCEDURE — 80324 DRUG SCREEN AMPHETAMINES 1/2: CPT

## 2024-06-27 PROCEDURE — 80346 BENZODIAZEPINES1-12: CPT

## 2024-06-27 PROCEDURE — 3078F DIAST BP <80 MM HG: CPT | Performed by: FAMILY MEDICINE

## 2024-06-27 PROCEDURE — 3008F BODY MASS INDEX DOCD: CPT | Performed by: FAMILY MEDICINE

## 2024-06-27 PROCEDURE — 1036F TOBACCO NON-USER: CPT | Performed by: FAMILY MEDICINE

## 2024-06-27 PROCEDURE — 99214 OFFICE O/P EST MOD 30 MIN: CPT | Performed by: FAMILY MEDICINE

## 2024-06-27 PROCEDURE — 80373 DRUG SCREENING TRAMADOL: CPT

## 2024-06-27 PROCEDURE — 80354 DRUG SCREENING FENTANYL: CPT

## 2024-06-27 PROCEDURE — 80358 DRUG SCREENING METHADONE: CPT

## 2024-06-27 PROCEDURE — 80365 DRUG SCREENING OXYCODONE: CPT

## 2024-06-27 PROCEDURE — 80368 SEDATIVE HYPNOTICS: CPT

## 2024-06-27 PROCEDURE — 3074F SYST BP LT 130 MM HG: CPT | Performed by: FAMILY MEDICINE

## 2024-06-27 RX ORDER — DEXTROAMPHETAMINE SACCHARATE, AMPHETAMINE ASPARTATE MONOHYDRATE, DEXTROAMPHETAMINE SULFATE AND AMPHETAMINE SULFATE 7.5; 7.5; 7.5; 7.5 MG/1; MG/1; MG/1; MG/1
30 CAPSULE, EXTENDED RELEASE ORAL EVERY MORNING
Qty: 30 CAPSULE | Refills: 0 | Status: SHIPPED | OUTPATIENT
Start: 2024-06-27 | End: 2024-07-27

## 2024-06-27 ASSESSMENT — PATIENT HEALTH QUESTIONNAIRE - PHQ9
1. LITTLE INTEREST OR PLEASURE IN DOING THINGS: NOT AT ALL
2. FEELING DOWN, DEPRESSED OR HOPELESS: NOT AT ALL
SUM OF ALL RESPONSES TO PHQ9 QUESTIONS 1 AND 2: 0

## 2024-06-27 NOTE — PROGRESS NOTES
Subjective   Patient ID: Christine Miller is a 43 y.o. female who presents for Follow-up.  She presents today for follow-up on her chronic medical problems.  She reports that overall she has been doing well.  She states that she has been having little more trouble with concentration and focus and would like to increase her Adderall.  She states that she has felt a little down the last few days but otherwise mood has been okay.  She denies any chest pain or shortness of breath or abdominal pain.  She states that she has not been exercising as much but is continue to work on eating healthy.  She still has not had her blood work done.  She still declines Pap smear.  She denies any other concerns.  HPI  Social History     Socioeconomic History    Marital status: Single     Spouse name: Not on file    Number of children: Not on file    Years of education: Not on file    Highest education level: Not on file   Occupational History    Not on file   Tobacco Use    Smoking status: Never    Smokeless tobacco: Never   Vaping Use    Vaping status: Never Used   Substance and Sexual Activity    Alcohol use: Not Currently    Drug use: Never    Sexual activity: Not on file   Other Topics Concern    Not on file   Social History Narrative    Not on file     Social Determinants of Health     Financial Resource Strain: Not on file   Food Insecurity: Not on file   Transportation Needs: Not on file   Physical Activity: Not on file   Stress: Not on file   Social Connections: Not on file   Intimate Partner Violence: Not on file   Housing Stability: Not on file     Current Outpatient Medications   Medication Sig Dispense Refill    amLODIPine (Norvasc) 5 mg tablet TAKE 1 TABLET BY MOUTH DAILY AS DIRECTED 90 tablet 1    drospirenone-ethinyl estradioL (Heiid, Gianvi) 3-0.02 mg tablet TAKE 1 TABLET BY MOUTH DAILY 28 tablet 2    fenofibrate (Tricor) 145 mg tablet TAKE 1 TABLET BY MOUTH EVERY DAY 90 tablet 1    FLUoxetine (PROzac) 40 mg capsule TAKE 2  "CAPSULES BY MOUTH DAILY 180 capsule 1    pantoprazole (ProtoNix) 20 mg EC tablet TAKE 1 TABLET BY MOUTH DAILY 90 tablet 1    amphetamine-dextroamphetamine XR (Adderall XR) 30 mg 24 hr capsule Take 1 capsule (30 mg) by mouth once daily in the morning. Do not crush or chew. 30 capsule 0     No current facility-administered medications for this visit.     Family History   Problem Relation Name Age of Onset    Breast cancer Mother       Review of Systems  Immunization History   Administered Date(s) Administered    Flu vaccine (IIV4), preservative free *Check age/dose* 12/29/2016, 09/24/2018, 10/23/2019, 12/01/2019, 11/19/2020, 11/02/2021    Flu vaccine, quadrivalent, no egg protein, age 6 month or greater (FLUCELVAX) 08/10/2017, 09/22/2022    Influenza, Unspecified 09/08/2015    Influenza, injectable, quadrivalent 10/01/2018    Influenza, seasonal, intradermal, preservative free 10/03/2012, 11/08/2013, 10/01/2017    Moderna SARS-CoV-2 50mcg/0.5mL 12/31/2022    Moderna SARS-CoV-2 Vaccination 11/02/2021    Pfizer COVID-19 vaccine, bivalent, age 12 years and older (30 mcg/0.3 mL) 09/22/2022    Pfizer Purple Cap SARS-CoV-2 01/15/2021, 02/05/2021       Review of Systems negative except as noted in HPI and Chief complaint.     Objective                 /78 (BP Location: Left arm, Patient Position: Sitting)   Pulse 85   Temp 36.7 °C (98.1 °F)   Ht 1.626 m (5' 4\")   Wt 91 kg (200 lb 9.6 oz)   BMI 34.43 kg/m²    Physical Exam  Vitals reviewed.   HENT:      Head: Normocephalic and atraumatic.      Right Ear: Tympanic membrane normal.      Left Ear: Tympanic membrane normal.      Nose: Nose normal.   Eyes:      Extraocular Movements: Extraocular movements intact.      Conjunctiva/sclera: Conjunctivae normal.      Pupils: Pupils are equal, round, and reactive to light.   Cardiovascular:      Rate and Rhythm: Normal rate and regular rhythm.      Pulses: Normal pulses.   Pulmonary:      Effort: Pulmonary effort is normal. "      Breath sounds: Normal breath sounds.   Abdominal:      General: There is no distension.      Palpations: Abdomen is soft.      Tenderness: There is no abdominal tenderness.   Musculoskeletal:         General: Normal range of motion.      Cervical back: Normal range of motion and neck supple.      Right lower leg: No edema.      Left lower leg: No edema.   Lymphadenopathy:      Cervical: No cervical adenopathy.   Neurological:      General: No focal deficit present.      Mental Status: She is alert.       No results found for this or any previous visit (from the past 96 hour(s)).    Assessment/Plan   Problem List Items Addressed This Visit       ADD (attention deficit disorder) - Primary     Patient reporting increased difficulty with concentration and focus.  Will increase Adderall XR to 30 mg daily.  Follow-up in 1 month to reevaluate.  [place]AR[place] was reviewed.  Controlled substance agreement is up-to-date.  Urine tox screen done today.         Relevant Medications    amphetamine-dextroamphetamine XR (Adderall XR) 30 mg 24 hr capsule    Other Relevant Orders    Opiate/Opioid/Benzo Prescription Compliance    OOB Internal Tracking    Benign essential hypertension     Blood pressure controlled.  continue current dose of amlodipine.  Strongly recommend she check blood work as previously ordered.         Depression with anxiety     Patient's noticed more symptoms that over the last few days but overall has been controlled.  Will continue with current dose of fluoxetine and reevaluate at follow-up in 1 month.         Class 1 obesity due to excess calories with serious comorbidity and body mass index (BMI) of 34.0 to 34.9 in adult    Healthcare maintenance     Mammogram ordered.  Strongly recommend patient have blood work done as previously ordered.  Also recommend Tdap and Pap smear which patient is declining.

## 2024-06-27 NOTE — ASSESSMENT & PLAN NOTE
Patient's noticed more symptoms that over the last few days but overall has been controlled.  Will continue with current dose of fluoxetine and reevaluate at follow-up in 1 month.

## 2024-06-27 NOTE — ASSESSMENT & PLAN NOTE
Patient reporting increased difficulty with concentration and focus.  Will increase Adderall XR to 30 mg daily.  Follow-up in 1 month to reevaluate.  OARRS was reviewed.  Controlled substance agreement is up-to-date.  Urine tox screen done today.

## 2024-06-27 NOTE — ASSESSMENT & PLAN NOTE
Blood pressure controlled.  continue current dose of amlodipine.  Strongly recommend she check blood work as previously ordered.

## 2024-06-27 NOTE — ASSESSMENT & PLAN NOTE
Mammogram ordered.  Strongly recommend patient have blood work done as previously ordered.  Also recommend Tdap and Pap smear which patient is declining.

## 2024-07-01 LAB
AMPHET UR-MCNC: 655 NG/ML
MDA UR-MCNC: <200 NG/ML
MDEA UR-MCNC: <200 NG/ML
MDMA UR-MCNC: <200 NG/ML
METHAMPHET UR-MCNC: <200 NG/ML
PHENTERMINE UR CFM-MCNC: <200 NG/ML

## 2024-07-05 ENCOUNTER — OFFICE VISIT (OUTPATIENT)
Dept: SURGICAL ONCOLOGY | Facility: HOSPITAL | Age: 44
End: 2024-07-05
Payer: COMMERCIAL

## 2024-07-05 ENCOUNTER — HOSPITAL ENCOUNTER (OUTPATIENT)
Dept: RADIOLOGY | Facility: HOSPITAL | Age: 44
Discharge: HOME | End: 2024-07-05
Payer: COMMERCIAL

## 2024-07-05 VITALS
HEIGHT: 64 IN | RESPIRATION RATE: 16 BRPM | WEIGHT: 192 LBS | DIASTOLIC BLOOD PRESSURE: 87 MMHG | SYSTOLIC BLOOD PRESSURE: 122 MMHG | HEART RATE: 83 BPM | BODY MASS INDEX: 32.78 KG/M2

## 2024-07-05 DIAGNOSIS — Z12.39 ENCOUNTER FOR OTHER SCREENING FOR MALIGNANT NEOPLASM OF BREAST: ICD-10-CM

## 2024-07-05 DIAGNOSIS — Z12.39 BREAST CANCER SCREENING, HIGH RISK PATIENT: Primary | ICD-10-CM

## 2024-07-05 PROCEDURE — 77067 SCR MAMMO BI INCL CAD: CPT

## 2024-07-05 PROCEDURE — 99214 OFFICE O/P EST MOD 30 MIN: CPT | Performed by: NURSE PRACTITIONER

## 2024-07-05 PROCEDURE — 1036F TOBACCO NON-USER: CPT | Performed by: NURSE PRACTITIONER

## 2024-07-05 PROCEDURE — 3074F SYST BP LT 130 MM HG: CPT | Performed by: NURSE PRACTITIONER

## 2024-07-05 PROCEDURE — 3008F BODY MASS INDEX DOCD: CPT | Performed by: NURSE PRACTITIONER

## 2024-07-05 PROCEDURE — 3079F DIAST BP 80-89 MM HG: CPT | Performed by: NURSE PRACTITIONER

## 2024-07-05 NOTE — PROGRESS NOTES
[facility] - [place], [place]  Christine Miller female   1980 43 y.o.   [id number]      Chief Complaint  Annual mammogram and exam.    History Of Present Illness  Christine Miller is a pleasant 43 y.o.  female followed in the breast center for high risk surveillance care. She denies breast surgery or biopsy. She has a personal history of gastric bypass surgery and has lost 150 lbs. She has a family history of breast cancer in her mother, age 42. She presents today for annual mammogram and exam.     BREAST IMAGIN2024 Bilateral screening mammogram, indicates BI-RADS Category 1.     REPRODUCTIVE HISTORY:  menarche age 15, nulliparous, OCP's x 4 years, premenopausal, LMP 3 weeks ago, scattered fibroglandular tissue     FAMILY CANCER HISTORY:  Mother: Breast cancer, age 42 (metastatic, had chemo/radiation)     Review of Systems  Constitutional:  Negative for appetite change, fatigue, fever and unexpected weight change.   HENT:  Negative for ear pain, hearing loss, nosebleeds, sore throat and trouble swallowing.    Eyes:  Negative for discharge, itching and visual disturbance.   Breast: As stated in HPI.  Respiratory:  Negative for cough, chest tightness and shortness of breath.    Cardiovascular:  Negative for chest pain, palpitations and leg swelling.   Gastrointestinal:  Negative for abdominal pain, constipation, diarrhea and nausea.   Endocrine: Negative for cold intolerance and heat intolerance.   Genitourinary:  Negative for dysuria, frequency, hematuria, pelvic pain and vaginal bleeding.   Musculoskeletal:  Negative for arthralgias, back pain, gait problem, joint swelling and myalgias.   Skin:  Negative for color change and rash.   Allergic/Immunologic: Negative for environmental allergies and food allergies.   Neurological:  Negative for dizziness, tremors, speech difficulty, weakness, numbness and headaches.   Hematological:  Does not bruise/bleed easily.   Psychiatric/Behavioral:  Negative for  agitation, dysphoric mood and sleep disturbance. The patient is not nervous/anxious.       Past Medical History  She has a past medical history of COVID-19 (12/21/2023).    Surgical History  She has a past surgical history that includes Other surgical history (06/22/2022); Other surgical history (03/29/2022); and Gastric bypass (1/25/22).     Family History  Cancer-related family history includes Breast cancer in her mother.     Social History  She reports that she has never smoked. She has never used smokeless tobacco. She reports that she does not currently use alcohol. She reports that she does not use drugs.    Allergies  Patient has no known allergies.    Medications  Current Outpatient Medications   Medication Instructions    amLODIPine (NORVASC) 5 mg, oral, Daily, as directed    amphetamine-dextroamphetamine XR (Adderall XR) 30 mg 24 hr capsule 30 mg, oral, Every morning, Do not crush or chew.    drospirenone-ethinyl estradioL (Heidi, Gianvi) 3-0.02 mg tablet 1 tablet, oral, Daily    fenofibrate (TRICOR) 145 mg, oral, Daily    FLUoxetine (PROZAC) 80 mg, oral, Daily    pantoprazole (PROTONIX) 20 mg, oral, Daily       Last Recorded Vitals  Vitals:    07/05/24 0748   BP: 122/87   Pulse: 83   Resp: 16       Physical Exam  Patient is alert and oriented x3 and in a relaxed and appropriate mood. Her gait is steady and hand grasps are equal. Sclera is clear. The breasts are nearly symmetrical. The tissue is soft without palpable abnormalities, discrete nodules or masses. The skin and nipples appear normal. There is no cervical, supraclavicular or axillary lymphadenopathy. Heart rate and rhythm normal, S1 and S2 appreciated. The lungs are clear to auscultation bilaterally. Abdomen is soft and non-tender.        Relevant Results and Imaging  BI mammo bilateral screening tomosynthesis 07/05/2024    Narrative  Interpreted By:  Linda Apple,  STUDY:  BI MAMMO BILATERAL SCREENING TOMOSYNTHESIS;  7/5/2024 8:14      ACCESSION NUMBER(S):  [id number]    ORDERING CLINICIAN:  TOYA GRANT    INDICATION:  Screening.    COMPARISON:  06/30/2023    FINDINGS:  2D and tomosynthesis images were reviewed at 1 mm slice thickness.    Density:  The breast tissue is almost entirely fatty.    No suspicious masses or calcifications are identified.    Impression  No mammographic evidence of malignancy.    BI-RADS CATEGORY:  BI-RADS Category:  1 Negative.  Recommendation:  Annual Screening.  Recommended Date:  1 Year.  Laterality:  Bilateral.        For any future breast imaging appointments, please call [phone] ([phone]).      MACRO:  None    Signed by: Linda Apple 7/5/2024 8:25 AM  Dictation workstation:   [id number]      I explained the results in depth, along with suggested explanation for follow up recommendations based on the testing results. BI-RADS Category 1      Risk Models  Risk models indicate personal risk of breast cancer in the next 5 years and lifetime (age 85-90):        Orders  Orders Placed This Encounter   Procedures    BI mammo bilateral screening tomosynthesis     Standing Status:   Future     Standing Expiration Date:   9/5/2025     Order Specific Question:   Is the patient pregnant?     Answer:   No     Order Specific Question:   Reason for exam:     Answer:   annual screening mammogram     Order Specific Question:   Radiologist to Determine Optimal Study     Answer:   Yes     Order Specific Question:   Release result to [employer]     Answer:   Immediate     Order Specific Question:   Is this exam part of a Research Study? If Yes, link this order to the research study     Answer:   No       Visit Diagnosis  1. Breast cancer screening, high risk patient  Clinic Appointment Request Follow Up    BI mammo bilateral screening tomosynthesis          Assessment/Plan  High risk surveillance care, normal clinical exam and imaging, no breast surgery or biopsy, family history of breast cancer, scattered  fibroglandular tissue     Plan:  Return July 2025 for bilateral screening mammogram and office visit. Endocrine therapy not strongly recommended at this time. Prefers annual screening with mammogram.    Patient Discussion/Summary  Your clinical examination and imaging are normal. Please return in one year for mammogram and office visit or sooner if you have any problems or concerns.     High risk breast surveillance care plan:  Yearly mammogram with digital breast tomosynthesis  Twice yearly clinical breast examinations  Breast MRI (to schedule call [phone])  Monthly self breast examinations &/or regular self breast awareness  Vitamin D3 2000 IU/daily (over the counter) unless your PCP recommends you take a specific dose  Exercise 3-4 times per week for 45-60 minutes  Limit alcohol to 3-4 drinks per week if you are menopausal  Eat healthy low-fat diet with lots of vegetable & fruits      IMPORTANT INFORMATION REGARDING YOUR RESULTS    You can see your health information, review clinical summaries from office visits & test results online when you follow your health with MY  Chart, a personal health record. To sign up go to [email or web address]. If you need assistance with signing up or trouble getting into your account call [employer] Patient Line 24/7 at [phone].    My office phone number is [phone] if you need to get in touch with me or have additional questions or concerns. Thank you for choosing [facility] and trusting me as your healthcare provider. I look forward to seeing you again at your next office visit. I am honored to be a provider on your health care team and I remain dedicated to helping you achieve your health goals.    Criselda Barker, RADHA-CNP

## 2024-07-29 ENCOUNTER — TELEPHONE (OUTPATIENT)
Dept: CARDIOLOGY | Facility: CLINIC | Age: 44
End: 2024-07-29
Payer: COMMERCIAL

## 2024-07-29 DIAGNOSIS — F98.8 ATTENTION DEFICIT DISORDER, UNSPECIFIED HYPERACTIVITY PRESENCE: ICD-10-CM

## 2024-07-29 RX ORDER — DEXTROAMPHETAMINE SACCHARATE, AMPHETAMINE ASPARTATE MONOHYDRATE, DEXTROAMPHETAMINE SULFATE AND AMPHETAMINE SULFATE 7.5; 7.5; 7.5; 7.5 MG/1; MG/1; MG/1; MG/1
30 CAPSULE, EXTENDED RELEASE ORAL EVERY MORNING
Qty: 30 CAPSULE | Refills: 0 | Status: SHIPPED | OUTPATIENT
Start: 2024-07-29 | End: 2024-08-28

## 2024-07-29 NOTE — TELEPHONE ENCOUNTER
Patient calling requesting a refill on her   amphetamine-dextroamphetamine XR (Adderall XR) 30 mg 24 hr capsule   To be sent to [facility] store [address]  [phone]

## 2024-08-01 ENCOUNTER — APPOINTMENT (OUTPATIENT)
Dept: PRIMARY CARE | Facility: CLINIC | Age: 44
End: 2024-08-01
Payer: COMMERCIAL

## 2024-08-22 DIAGNOSIS — Z30.41 ORAL CONTRACEPTIVE PILL SURVEILLANCE: ICD-10-CM

## 2024-08-23 RX ORDER — DROSPIRENONE AND ETHINYL ESTRADIOL 0.02-3(28)
1 KIT ORAL DAILY
Qty: 84 TABLET | Refills: 0 | Status: SHIPPED | OUTPATIENT
Start: 2024-08-23 | End: 2024-11-15

## 2024-08-27 DIAGNOSIS — E78.5 HYPERLIPIDEMIA, UNSPECIFIED HYPERLIPIDEMIA TYPE: ICD-10-CM

## 2024-08-27 DIAGNOSIS — F41.8 DEPRESSION WITH ANXIETY: ICD-10-CM

## 2024-08-30 RX ORDER — FLUOXETINE HYDROCHLORIDE 40 MG/1
80 CAPSULE ORAL DAILY
Qty: 180 CAPSULE | Refills: 0 | Status: SHIPPED | OUTPATIENT
Start: 2024-08-30

## 2024-08-30 RX ORDER — FENOFIBRATE 145 MG/1
145 TABLET, FILM COATED ORAL DAILY
Qty: 90 TABLET | Refills: 0 | Status: SHIPPED | OUTPATIENT
Start: 2024-08-30

## 2024-09-04 ENCOUNTER — TELEPHONE (OUTPATIENT)
Dept: PRIMARY CARE | Facility: CLINIC | Age: 44
End: 2024-09-04
Payer: COMMERCIAL

## 2024-09-04 DIAGNOSIS — F98.8 ATTENTION DEFICIT DISORDER, UNSPECIFIED HYPERACTIVITY PRESENCE: ICD-10-CM

## 2024-09-04 RX ORDER — DEXTROAMPHETAMINE SACCHARATE, AMPHETAMINE ASPARTATE MONOHYDRATE, DEXTROAMPHETAMINE SULFATE AND AMPHETAMINE SULFATE 7.5; 7.5; 7.5; 7.5 MG/1; MG/1; MG/1; MG/1
30 CAPSULE, EXTENDED RELEASE ORAL EVERY MORNING
Qty: 30 CAPSULE | Refills: 0 | Status: SHIPPED | OUTPATIENT
Start: 2024-09-04 | End: 2024-10-04

## 2024-09-04 NOTE — TELEPHONE ENCOUNTER
Pt requesting rx refill for adderall to [facility]/[place]. Pt states she has been off for 4 days and starting to get the shakes.     Pending appt 9/19/24  LOV 6/27/24

## 2024-09-19 ENCOUNTER — APPOINTMENT (OUTPATIENT)
Dept: PRIMARY CARE | Facility: CLINIC | Age: 44
End: 2024-09-19
Payer: COMMERCIAL

## 2024-09-19 VITALS
HEART RATE: 80 BPM | DIASTOLIC BLOOD PRESSURE: 81 MMHG | SYSTOLIC BLOOD PRESSURE: 117 MMHG | BODY MASS INDEX: 33.77 KG/M2 | TEMPERATURE: 98.1 F | HEIGHT: 64 IN | WEIGHT: 197.8 LBS

## 2024-09-19 DIAGNOSIS — F98.8 ATTENTION DEFICIT DISORDER, UNSPECIFIED HYPERACTIVITY PRESENCE: ICD-10-CM

## 2024-09-19 DIAGNOSIS — R73.9 HYPERGLYCEMIA: ICD-10-CM

## 2024-09-19 DIAGNOSIS — E66.09 CLASS 1 OBESITY DUE TO EXCESS CALORIES WITH SERIOUS COMORBIDITY AND BODY MASS INDEX (BMI) OF 33.0 TO 33.9 IN ADULT: ICD-10-CM

## 2024-09-19 DIAGNOSIS — B37.2 CANDIDIASIS OF SKIN: ICD-10-CM

## 2024-09-19 DIAGNOSIS — F41.8 DEPRESSION WITH ANXIETY: ICD-10-CM

## 2024-09-19 DIAGNOSIS — Z00.00 HEALTHCARE MAINTENANCE: ICD-10-CM

## 2024-09-19 DIAGNOSIS — I10 BENIGN ESSENTIAL HYPERTENSION: Primary | ICD-10-CM

## 2024-09-19 PROCEDURE — 3079F DIAST BP 80-89 MM HG: CPT | Performed by: FAMILY MEDICINE

## 2024-09-19 PROCEDURE — 99214 OFFICE O/P EST MOD 30 MIN: CPT | Performed by: FAMILY MEDICINE

## 2024-09-19 PROCEDURE — 3074F SYST BP LT 130 MM HG: CPT | Performed by: FAMILY MEDICINE

## 2024-09-19 PROCEDURE — 1036F TOBACCO NON-USER: CPT | Performed by: FAMILY MEDICINE

## 2024-09-19 PROCEDURE — 3008F BODY MASS INDEX DOCD: CPT | Performed by: FAMILY MEDICINE

## 2024-09-19 RX ORDER — NYSTATIN 100000 U/G
CREAM TOPICAL 2 TIMES DAILY
Qty: 30 G | Refills: 2 | Status: SHIPPED | OUTPATIENT
Start: 2024-09-19 | End: 2024-09-26

## 2024-09-19 NOTE — ASSESSMENT & PLAN NOTE
Symptoms well-controlled with current dose of Adderall.  OARRS reviewed.  Controlled substance agreement done in December 2023.  Urine tox screen done in June 2024.  Follow-up in 3 months or sooner with any concerns.

## 2024-09-19 NOTE — PROGRESS NOTES
Subjective   Patient ID: Christine Miller is a 43 y.o. female who presents for Follow-up (Med refill).  Patient presents today for follow-up on her chronic medical problems.  She reports that overall she has been doing well.  She continues to work on eating healthy and exercising and has continued to lose weight since her bariatric surgery.  She states that concentration and focus have been good with her current dose of Adderall.  She states that mood has been good with her current dose of fluoxetine.  She does have a rash on her abdomen.  She has been using Aquaphor on it.  She denies any chest pain or shortness of breath or abdominal pain.  She denies any other concerns.  HPI  Social History     Socioeconomic History    Marital status: Single     Spouse name: Not on file    Number of children: Not on file    Years of education: Not on file    Highest education level: Not on file   Occupational History    Not on file   Tobacco Use    Smoking status: Never    Smokeless tobacco: Never   Vaping Use    Vaping status: Never Used   Substance and Sexual Activity    Alcohol use: Not Currently    Drug use: Never    Sexual activity: Never   Other Topics Concern    Not on file   Social History Narrative    Not on file     Social Determinants of Health     Financial Resource Strain: Not on file   Food Insecurity: Not on file   Transportation Needs: Not on file   Physical Activity: Not on file   Stress: Not on file   Social Connections: Not on file   Intimate Partner Violence: Not on file   Housing Stability: Not on file     Current Outpatient Medications   Medication Sig Dispense Refill    amLODIPine (Norvasc) 5 mg tablet TAKE 1 TABLET BY MOUTH DAILY AS DIRECTED 90 tablet 1    amphetamine-dextroamphetamine XR (Adderall XR) 30 mg 24 hr capsule Take 1 capsule (30 mg) by mouth once daily in the morning. Do not crush or chew. 30 capsule 0    drospirenone-ethinyl estradioL (Heidi, Gianvi) 3-0.02 mg tablet TAKE 1 TABLET BY MOUTH DAILY  "84 tablet 0    fenofibrate (Tricor) 145 mg tablet TAKE 1 TABLET BY MOUTH EVERY DAY 90 tablet 0    FLUoxetine (PROzac) 40 mg capsule TAKE 2 CAPSULES BY MOUTH DAILY 180 capsule 0    pantoprazole (ProtoNix) 20 mg EC tablet TAKE 1 TABLET BY MOUTH DAILY 90 tablet 1    nystatin (Mycostatin) cream Apply topically 2 times a day for 7 days. apply to affected area 30 g 2     No current facility-administered medications for this visit.     Family History   Problem Relation Name Age of Onset    Breast cancer Mother Karla      Review of Systems  Immunization History   Administered Date(s) Administered    Flu vaccine (IIV4), preservative free *Check age/dose* 12/29/2016, 09/24/2018, 10/23/2019, 12/01/2019, 11/19/2020, 11/02/2021    Flu vaccine, quadrivalent, no egg protein, age 6 month or greater (FLUCELVAX) 08/10/2017, 09/22/2022    Influenza, Unspecified 09/08/2015    Influenza, injectable, quadrivalent 10/01/2018    Influenza, seasonal, intradermal, preservative free 10/03/2012, 11/08/2013, 10/01/2017    Moderna SARS-CoV-2 50mcg/0.5mL 12/31/2022    Moderna SARS-CoV-2 Vaccination 11/02/2021    Pfizer COVID-19 vaccine, bivalent, age 12 years and older (30 mcg/0.3 mL) 09/22/2022    Pfizer Purple Cap SARS-CoV-2 01/15/2021, 02/05/2021       Review of Systems negative except as noted in HPI and Chief complaint.     Objective                 /81 (BP Location: Left arm, Patient Position: Sitting)   Pulse 80   Temp 36.7 °C (98.1 °F)   Ht 1.626 m (5' 4\")   Wt 89.7 kg (197 lb 12.8 oz)   BMI 33.95 kg/m²    Physical Exam  Vitals reviewed.   HENT:      Head: Normocephalic and atraumatic.      Right Ear: Tympanic membrane normal.      Left Ear: Tympanic membrane normal.      Nose: Nose normal.      Mouth/Throat:      Pharynx: Oropharynx is clear.   Eyes:      Extraocular Movements: Extraocular movements intact.      Conjunctiva/sclera: Conjunctivae normal.      Pupils: Pupils are equal, round, and reactive to light. " "  Cardiovascular:      Rate and Rhythm: Normal rate and regular rhythm.      Pulses: Normal pulses.   Pulmonary:      Effort: Pulmonary effort is normal.      Breath sounds: Normal breath sounds.   Abdominal:      General: There is no distension.      Palpations: Abdomen is soft.      Tenderness: There is no abdominal tenderness.   Musculoskeletal:         General: Normal range of motion.      Cervical back: Normal range of motion and neck supple.      Right lower leg: No edema.      Left lower leg: No edema.   Lymphadenopathy:      Cervical: No cervical adenopathy.   Skin:     Comments: Patient with erythematous candidal rash in abdominal folds.   Neurological:      General: No focal deficit present.      Mental Status: She is alert.       No results found for this or any previous visit (from the past 96 hour(s)).  Lab Results   Component Value Date    WBC 10.0 02/16/2021    HGB 13.9 02/16/2021    HCT 43.8 02/16/2021    MCV 93 02/16/2021     02/16/2021     Lab Results   Component Value Date    GLUCOSE 93 02/16/2021    CALCIUM 9.2 02/16/2021     02/16/2021    K 4.5 02/16/2021    CO2 26 02/16/2021     02/16/2021    BUN 12 02/16/2021    CREATININE 0.79 02/16/2021     Lab Results   Component Value Date    CHOL 261 (H) 02/16/2021    CHOL 286 (H) 04/18/2019     Lab Results   Component Value Date    HDL 51.0 02/16/2021    HDL 50.0 04/18/2019     No results found for: \"LDLCALC\"  Lab Results   Component Value Date    TRIG 409 (H) 02/16/2021    TRIG 345 (H) 04/18/2019     No components found for: \"CHOLHDL\"   Lab Results   Component Value Date    TSH 3.53 02/16/2021      Lab Results   Component Value Date    HGBA1C 5.9 02/16/2021      No results found for: \"ALBUMINUR\"   Assessment/Plan   Problem List Items Addressed This Visit       ADD (attention deficit disorder)     Symptoms well-controlled with current dose of Adderall.  OARRS reviewed.  Controlled substance agreement done in December 2023.  Urine tox " screen done in June 2024.  Follow-up in 3 months or sooner with any concerns.         Benign essential hypertension - Primary     Blood pressure well-controlled.  Continue with amlodipine.  Check blood work as previously ordered.         Depression with anxiety     Symptoms well-controlled with current dose of fluoxetine.  Will reevaluate at follow-up.         Hyperglycemia     Blood sugars been elevated in the past.  Continue with healthy diet and exercise.  Check CMP and hemoglobin A1c as previously ordered.         Candidiasis of skin     Will treat with nystatin.  Follow-up if symptoms persist.         Relevant Medications    nystatin (Mycostatin) cream    Healthcare maintenance     Continue with healthy diet and exercise.  Screening blood work previously ordered.  Pap smear recommended.  Recommend Tdap and HPV vaccine.  Mammogram up-to-date.          Other Visit Diagnoses       Class 1 obesity due to excess calories with serious comorbidity and body mass index (BMI) of 33.0 to 33.9 in adult

## 2024-09-19 NOTE — ASSESSMENT & PLAN NOTE
Blood sugars been elevated in the past.  Continue with healthy diet and exercise.  Check CMP and hemoglobin A1c as previously ordered.

## 2024-09-19 NOTE — ASSESSMENT & PLAN NOTE
Blood pressure well-controlled.  Continue with amlodipine.  Check blood work as previously ordered.

## 2024-09-19 NOTE — ASSESSMENT & PLAN NOTE
Continue with healthy diet and exercise.  Screening blood work previously ordered.  Pap smear recommended.  Recommend Tdap and HPV vaccine.  Mammogram up-to-date.

## 2024-09-28 DIAGNOSIS — I10 BENIGN ESSENTIAL HYPERTENSION: ICD-10-CM

## 2024-09-28 DIAGNOSIS — K21.9 GASTROESOPHAGEAL REFLUX DISEASE, UNSPECIFIED WHETHER ESOPHAGITIS PRESENT: ICD-10-CM

## 2024-10-01 RX ORDER — PANTOPRAZOLE SODIUM 20 MG/1
20 TABLET, DELAYED RELEASE ORAL DAILY
Qty: 90 TABLET | Refills: 1 | Status: SHIPPED | OUTPATIENT
Start: 2024-10-01

## 2024-10-01 RX ORDER — AMLODIPINE BESYLATE 5 MG/1
5 TABLET ORAL DAILY
Qty: 90 TABLET | Refills: 1 | Status: SHIPPED | OUTPATIENT
Start: 2024-10-01

## 2024-10-14 DIAGNOSIS — F90.9 ATTENTION DEFICIT HYPERACTIVITY DISORDER (ADHD), UNSPECIFIED ADHD TYPE: Primary | ICD-10-CM

## 2024-10-14 RX ORDER — DEXTROAMPHETAMINE SACCHARATE, AMPHETAMINE ASPARTATE MONOHYDRATE, DEXTROAMPHETAMINE SULFATE AND AMPHETAMINE SULFATE 7.5; 7.5; 7.5; 7.5 MG/1; MG/1; MG/1; MG/1
30 CAPSULE, EXTENDED RELEASE ORAL EVERY MORNING
Qty: 30 CAPSULE | Refills: 0 | Status: SHIPPED | OUTPATIENT
Start: 2024-10-14 | End: 2024-11-13

## 2024-10-14 NOTE — TELEPHONE ENCOUNTER
Rx Refill Request Telephone Encounter    Name:  Christine Miller  :  [id number]  Medication Name: Adderall  Specific Pharmacy location:  [facility] [place]  Date of last appointment:  24  Date of next appointment:  24  Best number to reach patient:

## 2024-11-14 ENCOUNTER — TELEPHONE (OUTPATIENT)
Dept: PRIMARY CARE | Facility: CLINIC | Age: 44
End: 2024-11-14
Payer: COMMERCIAL

## 2024-11-14 DIAGNOSIS — F90.9 ATTENTION DEFICIT HYPERACTIVITY DISORDER (ADHD), UNSPECIFIED ADHD TYPE: ICD-10-CM

## 2024-11-14 NOTE — TELEPHONE ENCOUNTER
Rx Refill Request Telephone Encounter    Name:  Christine Miller  :  [id number]  Medication Name: amphetamine-dextroamphetamine XR (Adderall XR) 30 mg 24 hr capsule [[id number]]    Specific Pharmacy location: [id number] - [place], [place] - [address] AT [facility] [address]  [address], [facility] [address]  Phone: [phone]  Fax: [phone]     Date of last appointment:   2024  Date of next appointment:   24  Best number to reach patient:

## 2024-11-18 RX ORDER — DEXTROAMPHETAMINE SACCHARATE, AMPHETAMINE ASPARTATE MONOHYDRATE, DEXTROAMPHETAMINE SULFATE AND AMPHETAMINE SULFATE 7.5; 7.5; 7.5; 7.5 MG/1; MG/1; MG/1; MG/1
30 CAPSULE, EXTENDED RELEASE ORAL EVERY MORNING
Qty: 30 CAPSULE | Refills: 0 | Status: SHIPPED | OUTPATIENT
Start: 2024-11-18 | End: 2024-12-18

## 2024-12-11 ENCOUNTER — TELEPHONE (OUTPATIENT)
Dept: NEPHROLOGY | Facility: CLINIC | Age: 44
End: 2024-12-11
Payer: COMMERCIAL

## 2024-12-11 NOTE — PROGRESS NOTES
Pt called the nurse line and would like to know if it is safe for her to do another Tb test stated she believes she had it done 8 months ago at her previous job but is not sure.

## 2024-12-19 ENCOUNTER — TELEPHONE (OUTPATIENT)
Dept: PRIMARY CARE | Facility: CLINIC | Age: 44
End: 2024-12-19

## 2024-12-19 ENCOUNTER — APPOINTMENT (OUTPATIENT)
Dept: PRIMARY CARE | Facility: CLINIC | Age: 44
End: 2024-12-19
Payer: COMMERCIAL

## 2024-12-19 VITALS
DIASTOLIC BLOOD PRESSURE: 86 MMHG | SYSTOLIC BLOOD PRESSURE: 138 MMHG | WEIGHT: 208.8 LBS | BODY MASS INDEX: 35.65 KG/M2 | HEART RATE: 78 BPM | HEIGHT: 64 IN

## 2024-12-19 DIAGNOSIS — F90.9 ATTENTION DEFICIT HYPERACTIVITY DISORDER (ADHD), UNSPECIFIED ADHD TYPE: ICD-10-CM

## 2024-12-19 DIAGNOSIS — R79.89 ABNORMAL TSH: ICD-10-CM

## 2024-12-19 DIAGNOSIS — F41.8 DEPRESSION WITH ANXIETY: ICD-10-CM

## 2024-12-19 DIAGNOSIS — E78.5 HYPERLIPIDEMIA, UNSPECIFIED HYPERLIPIDEMIA TYPE: ICD-10-CM

## 2024-12-19 DIAGNOSIS — M79.674 PAIN OF TOE OF RIGHT FOOT: ICD-10-CM

## 2024-12-19 DIAGNOSIS — E66.812 CLASS 2 SEVERE OBESITY DUE TO EXCESS CALORIES WITH SERIOUS COMORBIDITY AND BODY MASS INDEX (BMI) OF 35.0 TO 35.9 IN ADULT: ICD-10-CM

## 2024-12-19 DIAGNOSIS — R73.9 HYPERGLYCEMIA: ICD-10-CM

## 2024-12-19 DIAGNOSIS — E78.2 MIXED HYPERLIPIDEMIA: ICD-10-CM

## 2024-12-19 DIAGNOSIS — I10 BENIGN ESSENTIAL HYPERTENSION: Primary | ICD-10-CM

## 2024-12-19 DIAGNOSIS — R53.83 FATIGUE, UNSPECIFIED TYPE: ICD-10-CM

## 2024-12-19 DIAGNOSIS — Z30.41 ORAL CONTRACEPTIVE PILL SURVEILLANCE: ICD-10-CM

## 2024-12-19 DIAGNOSIS — F98.8 ATTENTION DEFICIT DISORDER, UNSPECIFIED TYPE: ICD-10-CM

## 2024-12-19 DIAGNOSIS — E66.01 CLASS 2 SEVERE OBESITY DUE TO EXCESS CALORIES WITH SERIOUS COMORBIDITY AND BODY MASS INDEX (BMI) OF 35.0 TO 35.9 IN ADULT: ICD-10-CM

## 2024-12-19 PROCEDURE — 3079F DIAST BP 80-89 MM HG: CPT | Performed by: FAMILY MEDICINE

## 2024-12-19 PROCEDURE — 3008F BODY MASS INDEX DOCD: CPT | Performed by: FAMILY MEDICINE

## 2024-12-19 PROCEDURE — 1036F TOBACCO NON-USER: CPT | Performed by: FAMILY MEDICINE

## 2024-12-19 PROCEDURE — 99214 OFFICE O/P EST MOD 30 MIN: CPT | Performed by: FAMILY MEDICINE

## 2024-12-19 PROCEDURE — 3075F SYST BP GE 130 - 139MM HG: CPT | Performed by: FAMILY MEDICINE

## 2024-12-19 RX ORDER — FENOFIBRATE 145 MG/1
145 TABLET, FILM COATED ORAL DAILY
Qty: 90 TABLET | Refills: 1 | Status: SHIPPED | OUTPATIENT
Start: 2024-12-19

## 2024-12-19 RX ORDER — DEXTROAMPHETAMINE SACCHARATE, AMPHETAMINE ASPARTATE MONOHYDRATE, DEXTROAMPHETAMINE SULFATE AND AMPHETAMINE SULFATE 7.5; 7.5; 7.5; 7.5 MG/1; MG/1; MG/1; MG/1
30 CAPSULE, EXTENDED RELEASE ORAL EVERY MORNING
Qty: 30 CAPSULE | Refills: 0 | Status: SHIPPED | OUTPATIENT
Start: 2024-12-19 | End: 2025-01-18

## 2024-12-19 RX ORDER — DROSPIRENONE AND ETHINYL ESTRADIOL 0.02-3(28)
1 KIT ORAL DAILY
Qty: 84 TABLET | Refills: 0 | Status: SHIPPED | OUTPATIENT
Start: 2024-12-19 | End: 2025-03-13

## 2024-12-19 RX ORDER — DEXTROAMPHETAMINE SACCHARATE, AMPHETAMINE ASPARTATE MONOHYDRATE, DEXTROAMPHETAMINE SULFATE AND AMPHETAMINE SULFATE 7.5; 7.5; 7.5; 7.5 MG/1; MG/1; MG/1; MG/1
30 CAPSULE, EXTENDED RELEASE ORAL EVERY MORNING
Qty: 30 CAPSULE | Refills: 0 | Status: SHIPPED | OUTPATIENT
Start: 2024-12-19 | End: 2024-12-19 | Stop reason: SDUPTHER

## 2024-12-19 RX ORDER — FLUOXETINE HYDROCHLORIDE 40 MG/1
80 CAPSULE ORAL DAILY
Qty: 180 CAPSULE | Refills: 1 | Status: SHIPPED | OUTPATIENT
Start: 2024-12-19

## 2024-12-19 NOTE — ASSESSMENT & PLAN NOTE
Continue to work on healthy diet and exercise.  Strongly recommend patient have lipids done as previously recommended.

## 2024-12-19 NOTE — ASSESSMENT & PLAN NOTE
Blood pressure controlled borderline today.  Continue to work on healthy diet and exercise.  Continue with Norvasc.  Strongly recommend she have blood work done as previously ordered.  Will reevaluate at follow-up.

## 2024-12-19 NOTE — ASSESSMENT & PLAN NOTE
Blood sugars been borderline in the past however this was before her back to bariatric surgery.  Again strongly recommend she proceed with blood work including CMP and hemoglobin A1c.

## 2024-12-19 NOTE — PROGRESS NOTES
Subjective   Patient ID: Christine Miller is a 44 y.o. female who presents for Follow-up, Vertigo, and Toe Pain (Patient states has pain in right 5th toe-sts took a chunk of skin off).  Patient presents today for follow-up on her chronic medical problems.  She reports that overall she is doing well.  She got a new job.  She is still working in a nursing home.  She states that her right little toe has been bothering her.  She otherwise reports that she has been doing well.  She denies any chest pain or shortness of breath or abdominal pain.  She ran out of her birth control pills and did get a regular period.  She denies any chest pain or shortness of breath or abdominal pain.  Reports that her Adderall continues to work well for her.  Denies any side effects from this.  She still has not had her blood work done that I have recommended on multiple occasions.  She denies any other concerns.  HPI  Social History     Socioeconomic History    Marital status: Single     Spouse name: Not on file    Number of children: Not on file    Years of education: Not on file    Highest education level: Not on file   Occupational History    Not on file   Tobacco Use    Smoking status: Never    Smokeless tobacco: Never   Vaping Use    Vaping status: Never Used   Substance and Sexual Activity    Alcohol use: Not Currently    Drug use: Never    Sexual activity: Never   Other Topics Concern    Not on file   Social History Narrative    Not on file     Social Drivers of Health     Financial Resource Strain: Not on file   Food Insecurity: Not on file   Transportation Needs: Not on file   Physical Activity: Not on file   Stress: Not on file   Social Connections: Not on file   Intimate Partner Violence: Not on file   Housing Stability: Not on file     Current Outpatient Medications   Medication Sig Dispense Refill    amLODIPine (Norvasc) 5 mg tablet TAKE 1 TABLET BY MOUTH DAILY AS DIRECTED 90 tablet 1    pantoprazole (ProtoNix) 20 mg EC tablet  "TAKE 1 TABLET BY MOUTH DAILY 90 tablet 1    amphetamine-dextroamphetamine XR (Adderall XR) 30 mg 24 hr capsule Take 1 capsule (30 mg) by mouth once daily in the morning. Do not crush or chew. 30 capsule 0    drospirenone-ethinyl estradiol (Heidi, Gianvi) 3-0.02 mg tablet Take 1 tablet by mouth once daily. 84 tablet 0    fenofibrate (Tricor) 145 mg tablet Take 1 tablet (145 mg) by mouth once daily. 90 tablet 1    FLUoxetine (PROzac) 40 mg capsule Take 2 capsules (80 mg) by mouth once daily. 180 capsule 1     No current facility-administered medications for this visit.     Family History   Problem Relation Name Age of Onset    Breast cancer Mother Karla      Review of Systems  Immunization History   Administered Date(s) Administered    COVID-19, mRNA, LNP-S, PF, 30 mcg/0.3 mL dose 01/15/2021, 02/05/2021    Flu vaccine (IIV4), preservative free *Check age/dose* 12/29/2016, 09/24/2018, 10/23/2019, 12/01/2019, 11/19/2020, 11/02/2021    Flu vaccine, quadrivalent, no egg protein, age 6 month or greater (FLUCELVAX) 08/10/2017, 09/22/2022    Influenza, Unspecified 09/08/2015    Influenza, injectable, quadrivalent 10/01/2018    Influenza, seasonal, intradermal, preservative free 10/03/2012, 11/08/2013, 10/01/2017    Moderna SARS-CoV-2 50mcg/0.5mL 12/31/2022    Moderna SARS-CoV-2 Vaccination 11/02/2021    Pfizer COVID-19 vaccine, bivalent, age 12 years and older (30 mcg/0.3 mL) 09/22/2022       Review of Systems negative except as noted in HPI and Chief complaint.     Objective                 /86   Pulse 78   Ht 1.626 m (5' 4\")   Wt 94.7 kg (208 lb 12.8 oz)   BMI 35.84 kg/m²    Physical Exam  Vitals reviewed.   HENT:      Head: Normocephalic and atraumatic.      Right Ear: Tympanic membrane normal.      Left Ear: Tympanic membrane normal.      Nose: Nose normal.      Mouth/Throat:      Pharynx: Oropharynx is clear.   Eyes:      Extraocular Movements: Extraocular movements intact.      Conjunctiva/sclera: Conjunctivae " "normal.      Pupils: Pupils are equal, round, and reactive to light.   Cardiovascular:      Rate and Rhythm: Normal rate and regular rhythm.      Pulses: Normal pulses.   Pulmonary:      Effort: Pulmonary effort is normal.      Breath sounds: Normal breath sounds.   Abdominal:      General: There is no distension.      Palpations: Abdomen is soft.      Tenderness: There is no abdominal tenderness.   Musculoskeletal:         General: Normal range of motion.      Cervical back: Normal range of motion and neck supple.      Right lower leg: No edema.      Left lower leg: No edema.   Feet:      Comments: Right fifth toe with some overgrown skin over the nail laterally.  Lymphadenopathy:      Cervical: No cervical adenopathy.   Neurological:      General: No focal deficit present.      Mental Status: She is alert.       No results found for this or any previous visit (from the past 96 hours).  Lab Results   Component Value Date    WBC 10.0 02/16/2021    HGB 13.9 02/16/2021    HCT 43.8 02/16/2021    MCV 93 02/16/2021     02/16/2021     Lab Results   Component Value Date    GLUCOSE 93 02/16/2021    CALCIUM 9.2 02/16/2021     02/16/2021    K 4.5 02/16/2021    CO2 26 02/16/2021     02/16/2021    BUN 12 02/16/2021    CREATININE 0.79 02/16/2021     Lab Results   Component Value Date    CHOL 261 (H) 02/16/2021    CHOL 286 (H) 04/18/2019     Lab Results   Component Value Date    HDL 51.0 02/16/2021    HDL 50.0 04/18/2019     No results found for: \"LDLCALC\"  Lab Results   Component Value Date    TRIG 409 (H) 02/16/2021    TRIG 345 (H) 04/18/2019     No components found for: \"CHOLHDL\"   Lab Results   Component Value Date    TSH 3.53 02/16/2021      Lab Results   Component Value Date    HGBA1C 5.9 02/16/2021      No results found for: \"ALBUMINUR\"   Assessment/Plan   Problem List Items Addressed This Visit       Abnormal TSH     As above.  Plan to repeat TSH.         Relevant Orders    TSH with reflex to Free T4 if " abnormal    ADD (attention deficit disorder)     Symptoms overall controlled.  Continue with current dose of Adderall.  [place] reviewed.  Controlled substance agreement signed today.  Urine tox screen last done in June.  Follow-up in 3 months or sooner with any concerns.         Relevant Medications    amphetamine-dextroamphetamine XR (Adderall XR) 30 mg 24 hr capsule    Benign essential hypertension - Primary     Blood pressure controlled borderline today.  Continue to work on healthy diet and exercise.  Continue with Norvasc.  Strongly recommend she have blood work done as previously ordered.  Will reevaluate at follow-up.         Relevant Orders    Albumin-Creatinine Ratio, Urine Random    Lipid Panel    Comprehensive Metabolic Panel    CBC and Auto Differential    Depression with anxiety     Symptoms well-controlled with current dose of fluoxetine.         Relevant Medications    FLUoxetine (PROzac) 40 mg capsule    Fatigue     Check blood work as ordered.         Relevant Orders    Vitamin B12    Hyperglycemia     Blood sugars been borderline in the past however this was before her back to bariatric surgery.  Again strongly recommend she proceed with blood work including CMP and hemoglobin A1c.         Relevant Orders    Hemoglobin A1C    Mixed hyperlipidemia     Continue to work on healthy diet and exercise.  Strongly recommend patient have lipids done as previously recommended.         Class 2 severe obesity due to excess calories with serious comorbidity and body mass index (BMI) of 35.0 to 35.9 in adult    Pain of toe of right foot     Patient to see podiatry for further evaluation.          Other Visit Diagnoses       Oral contraceptive pill surveillance        Relevant Medications    drospirenone-ethinyl estradiol (Ayanna Gordon) 3-0.02 mg tablet    Hyperlipidemia, unspecified hyperlipidemia type        Relevant Medications    fenofibrate (Tricor) 145 mg tablet

## 2024-12-19 NOTE — ASSESSMENT & PLAN NOTE
Symptoms overall controlled.  Continue with current dose of Adderall.  [place]AR[place] reviewed.  Controlled substance agreement signed today.  Urine tox screen last done in June.  Follow-up in 3 months or sooner with any concerns.

## 2024-12-19 NOTE — ASSESSMENT & PLAN NOTE
Continue with healthy diet and exercise.  Screening blood work again ordered.  Recommend Pap smear as patient is overdue for this.  She has declined.  Birth control pills were refilled.

## 2024-12-19 NOTE — TELEPHONE ENCOUNTER
Pt requesting her Rx for adderall 25mg be sent to [facility] pharmacy/[address]. Pt states that the [facility] pharmacy in [place] for this medication is on back order for one month.

## 2025-01-04 ENCOUNTER — OFFICE VISIT (OUTPATIENT)
Dept: URGENT CARE | Age: 45
End: 2025-01-04
Payer: COMMERCIAL

## 2025-01-04 VITALS
WEIGHT: 207 LBS | SYSTOLIC BLOOD PRESSURE: 130 MMHG | HEART RATE: 77 BPM | DIASTOLIC BLOOD PRESSURE: 90 MMHG | BODY MASS INDEX: 35.53 KG/M2 | TEMPERATURE: 98.4 F | OXYGEN SATURATION: 98 % | RESPIRATION RATE: 16 BRPM

## 2025-01-04 DIAGNOSIS — L03.039 PARONYCHIA OF FIFTH TOE: Primary | ICD-10-CM

## 2025-01-04 RX ORDER — MUPIROCIN 20 MG/G
OINTMENT TOPICAL 2 TIMES DAILY
Qty: 44 G | Refills: 0 | Status: SHIPPED | OUTPATIENT
Start: 2025-01-04 | End: 2025-01-14

## 2025-01-04 ASSESSMENT — ENCOUNTER SYMPTOMS
COUGH: 0
DIARRHEA: 0
VOMITING: 0
ABDOMINAL PAIN: 0
CHILLS: 0
FEVER: 0
JOINT SWELLING: 1
CHEST TIGHTNESS: 0
SHORTNESS OF BREATH: 0
WEAKNESS: 0
NUMBNESS: 0
FATIGUE: 0
AGITATION: 0
CONFUSION: 0
ARTHRALGIAS: 1
NAUSEA: 0

## 2025-01-04 NOTE — PROGRESS NOTES
Subjective   Patient ID: Christine Miller is a 44 y.o. female. They present today with a chief complaint of Toe Problem (Right small toe infection x 2 weeks).    History of Present Illness  Pt states peeled the hard skin of right small toe in hottub 2wks ago and noticed pain with mild swelling. No open lesion or drainage and concerned for infection.           Past Medical History  Allergies as of 01/04/2025    (No Known Allergies)       (Not in a hospital admission)       Past Medical History:   Diagnosis Date    COVID-19 12/21/2023       Past Surgical History:   Procedure Laterality Date    GASTRIC BYPASS  1/25/22    OTHER SURGICAL HISTORY  06/22/2022    Bariatric surgery    OTHER SURGICAL HISTORY  03/29/2022    Gastric bypass surgery        reports that she has never smoked. She has never used smokeless tobacco. She reports that she does not currently use alcohol. She reports that she does not use drugs.    Review of Systems  Review of Systems   Constitutional:  Negative for chills, fatigue and fever.   Respiratory:  Negative for cough, chest tightness and shortness of breath.    Cardiovascular:  Negative for chest pain.   Gastrointestinal:  Negative for abdominal pain, diarrhea, nausea and vomiting.   Musculoskeletal:  Positive for arthralgias and joint swelling.   Skin:  Negative for rash.   Neurological:  Negative for weakness and numbness.   Psychiatric/Behavioral:  Negative for agitation and confusion.                                   Objective    Vitals:    01/04/25 1126   BP: 130/90   BP Location: Left arm   Patient Position: Sitting   BP Cuff Size: Large adult   Pulse: 77   Resp: 16   Temp: 36.9 °C (98.4 °F)   TempSrc: Oral   SpO2: 98%   Weight: 93.9 kg (207 lb)     No LMP recorded.    Physical Exam  Constitutional:       Appearance: Normal appearance.   HENT:      Head: Normocephalic and atraumatic.      Nose: Nose normal.   Cardiovascular:      Rate and Rhythm: Normal rate and regular rhythm.      Heart  sounds: No murmur heard.  Pulmonary:      Effort: Pulmonary effort is normal.      Breath sounds: Normal breath sounds.   Abdominal:      General: Abdomen is flat.      Palpations: Abdomen is soft.   Musculoskeletal:         General: Normal range of motion.   Skin:     General: Skin is warm and dry.      Findings: No rash.      Comments: Mild tenderness and swelling of right 5th toe near toenail, thickened skin around lateral aspect of toenail, no open lesion, full ROM of toe   Neurological:      Mental Status: She is alert and oriented to person, place, and time.   Psychiatric:         Mood and Affect: Mood normal.         Procedures    Point of Care Test & Imaging Results from this visit  No results found for this visit on 01/04/25.   No results found.    Diagnostic study results (if any) were reviewed by Zahida Powers PA-C.    Assessment/Plan   Allergies, medications, history, and pertinent labs/EKGs/Imaging reviewed by Zahida Powers PA-C.     Medical Decision Making  Cannot completely r/o infection but other possible causes like ingrown toenail or callus, referred to podiatrist to better manage condition    Orders and Diagnoses  Diagnoses and all orders for this visit:  Paronychia of fifth toe  -     mupirocin (Bactroban) 2 % ointment; Apply topically 2 times a day for 10 days.  -     Referral to Podiatry; Future      Medical Admin Record      Patient disposition: Home    Electronically signed by Zahida Powers PA-C  12:20 PM

## 2025-01-08 ENCOUNTER — APPOINTMENT (OUTPATIENT)
Dept: PODIATRY | Facility: CLINIC | Age: 45
End: 2025-01-08
Payer: COMMERCIAL

## 2025-01-08 DIAGNOSIS — L84 CALLUS: ICD-10-CM

## 2025-01-08 DIAGNOSIS — M20.5X2 ACQUIRED ADDUCTOVARUS ROTATION OF TOE OF LEFT FOOT: ICD-10-CM

## 2025-01-08 DIAGNOSIS — M79.674 PAIN OF TOE OF RIGHT FOOT: Primary | ICD-10-CM

## 2025-01-08 DIAGNOSIS — M20.5X1 ACQUIRED ADDUCTOVARUS ROTATION OF TOE OF RIGHT FOOT: ICD-10-CM

## 2025-01-08 PROCEDURE — 1036F TOBACCO NON-USER: CPT | Performed by: PODIATRIST

## 2025-01-08 PROCEDURE — 99203 OFFICE O/P NEW LOW 30 MIN: CPT | Performed by: PODIATRIST

## 2025-01-08 NOTE — PROGRESS NOTES
"History Of Present Illness  Christine Miller is a 44 y.o. female presenting with chief complaint of: establish care .Right foot pinky toe painful rolling under. Would like all toes checked.    The patient is a 44-year-old female who presents today for evaluation of her right fifth digit.  Review of her chart shows she was seen in urgent care at 1/4/2025.  At that time she was noted to have hardened skin and a possible infection.  She was prescribed topical antibiotic for 10 days.  She admits to not filling this.  She states her toe \"rolls under\".  She believes this is causing her discomfort.  PCP Jeannie Rajan MD  Last Visit 12/19/24     Past Medical History  She has a past medical history of COVID-19 (12/21/2023).    Surgical History  She has a past surgical history that includes Other surgical history (06/22/2022); Other surgical history (03/29/2022); and Gastric bypass (1/25/22).     Social History  She reports that she has never smoked. She has never used smokeless tobacco. She reports that she does not currently use alcohol. She reports that she does not use drugs.    Family History  Family History   Problem Relation Name Age of Onset    Breast cancer Mother Karla Black  Patient has no known allergies.    Medications  Current Outpatient Medications   Medication Sig Dispense Refill    amLODIPine (Norvasc) 5 mg tablet TAKE 1 TABLET BY MOUTH DAILY AS DIRECTED 90 tablet 1    amphetamine-dextroamphetamine XR (Adderall XR) 30 mg 24 hr capsule Take 1 capsule (30 mg) by mouth once daily in the morning. Do not crush or chew. 30 capsule 0    drospirenone-ethinyl estradiol (Heidi, Gianvi) 3-0.02 mg tablet Take 1 tablet by mouth once daily. 84 tablet 0    fenofibrate (Tricor) 145 mg tablet Take 1 tablet (145 mg) by mouth once daily. 90 tablet 1    FLUoxetine (PROzac) 40 mg capsule Take 2 capsules (80 mg) by mouth once daily. 180 capsule 1    mupirocin (Bactroban) 2 % ointment Apply topically 2 times a day for 10 " days. 44 g 0    pantoprazole (ProtoNix) 20 mg EC tablet TAKE 1 TABLET BY MOUTH DAILY 90 tablet 1     No current facility-administered medications for this visit.       Review of Systems    REVIEW OF SYSTEMS  GENERAL:  Negative for malaise, significant weight loss, fever      Objective:   Vasc: DP and PT pulses are palpable bilateral.  CFT is less than 3 seconds bilateral.  Skin temperature is warm to cool proximal to distal bilateral.      Neuro:  Light touch is intact to the foot bilateral.  Protective sensation is intact to the foot     Derm: Hypertrophic callus noted over the dorsal lateral distal aspect of the right fifth digit.  No open wounds.  No drainage.  No bacterial signs of infection.  No ingrowing paronychias noted bilaterally    Ortho: Muscle strength is 5/5 for all pedal groups tested.  Adductovarus rotation noted to bilateral fourth and fifth digits.  These are flexible in nature.  Pain with palpation over the right distal lateral fifth digit.  The patient is able to perform active range of motion of the digits.  Upon weightbearing there is increased inversion to the fourth and fifth digits bilaterally.  Patient has a supinated gait.    Assessment/Plan     1.  Adductovarus rotation bilateral fifth digits, bilateral fourth digit  Patient adductovarus rotation noted to bilateral fourth and fifth digits.  This is causing a prominent condyle to the right fifth digit.  Coupled with her supinated gait she is getting the hypertrophic lesion.  No bacterial signs infection noted.  We discussed using silicone tape, moleskin and toe spacers to help this deformity    2.  Callus right fifth digit  Due to her supinated gait and adductovarus rotation of the toe, she is given hypertrophic callus.  This is causing pain and discomfort.  We discussed maintenance with Kerasal cream.  A handout was given on this today    Patient is evaluated and examined.  X-ray ordered of the right foot to evaluate her deformity.   Continue with conservative measures as above.  She understands if she fails conservative measures or her deformity increases and her pain increases we can consider derotational arthroplasty       Low Medical Decision Making     Diagnosis: 2 or more self limiting or minor problems-adductovarus deformities right fourth and fifth digit, callus right fifth digit    Data Review:   · Review prior external medical records: Note from urgent care reviewed      Plan and Associated Low Level of Risk: Conservative measures as above           Rosa Araiza DPM

## 2025-01-27 ENCOUNTER — TELEPHONE (OUTPATIENT)
Dept: PRIMARY CARE | Facility: CLINIC | Age: 45
End: 2025-01-27
Payer: COMMERCIAL

## 2025-01-27 DIAGNOSIS — F90.9 ATTENTION DEFICIT HYPERACTIVITY DISORDER (ADHD), UNSPECIFIED ADHD TYPE: ICD-10-CM

## 2025-01-27 RX ORDER — DEXTROAMPHETAMINE SACCHARATE, AMPHETAMINE ASPARTATE MONOHYDRATE, DEXTROAMPHETAMINE SULFATE AND AMPHETAMINE SULFATE 7.5; 7.5; 7.5; 7.5 MG/1; MG/1; MG/1; MG/1
30 CAPSULE, EXTENDED RELEASE ORAL EVERY MORNING
Qty: 30 CAPSULE | Refills: 0 | Status: SHIPPED | OUTPATIENT
Start: 2025-01-27 | End: 2025-02-26

## 2025-01-27 NOTE — TELEPHONE ENCOUNTER
Pt requesting rx refill for adderall XR to [facility] pharmacy/[place].    LOV 12/19/24  Pending appt 04/21/25

## 2025-01-31 DIAGNOSIS — E78.5 HYPERLIPIDEMIA, UNSPECIFIED HYPERLIPIDEMIA TYPE: ICD-10-CM

## 2025-01-31 RX ORDER — FENOFIBRATE 145 MG/1
145 TABLET, FILM COATED ORAL DAILY
Qty: 90 TABLET | Refills: 0 | Status: SHIPPED | OUTPATIENT
Start: 2025-01-31

## 2025-02-12 ENCOUNTER — TELEPHONE (OUTPATIENT)
Dept: PODIATRY | Facility: CLINIC | Age: 45
End: 2025-02-12
Payer: COMMERCIAL

## 2025-02-12 DIAGNOSIS — M79.674 PAIN IN TOES OF BOTH FEET: Primary | ICD-10-CM

## 2025-02-12 DIAGNOSIS — M79.675 PAIN IN TOES OF BOTH FEET: Primary | ICD-10-CM

## 2025-02-12 RX ORDER — MELOXICAM 15 MG/1
15 TABLET ORAL AS NEEDED
Qty: 30 TABLET | Refills: 0 | Status: SHIPPED | OUTPATIENT
Start: 2025-02-12 | End: 2025-03-14

## 2025-02-12 NOTE — TELEPHONE ENCOUNTER
Pt called in requesting appointment, states treatment at last appointment has not helped the foot. Foot is still painful, states the script for the otc medication has not helped, she is using it nightly.     Per patient request, anti-inflammatory called in

## 2025-02-25 DIAGNOSIS — F90.9 ATTENTION DEFICIT HYPERACTIVITY DISORDER (ADHD), UNSPECIFIED ADHD TYPE: ICD-10-CM

## 2025-02-25 RX ORDER — DEXTROAMPHETAMINE SACCHARATE, AMPHETAMINE ASPARTATE MONOHYDRATE, DEXTROAMPHETAMINE SULFATE AND AMPHETAMINE SULFATE 7.5; 7.5; 7.5; 7.5 MG/1; MG/1; MG/1; MG/1
30 CAPSULE, EXTENDED RELEASE ORAL EVERY MORNING
Qty: 30 CAPSULE | Refills: 0 | Status: SHIPPED | OUTPATIENT
Start: 2025-02-25 | End: 2025-03-27

## 2025-03-03 ENCOUNTER — TELEPHONE (OUTPATIENT)
Dept: PRIMARY CARE | Facility: CLINIC | Age: 45
End: 2025-03-03
Payer: COMMERCIAL

## 2025-03-03 DIAGNOSIS — F90.9 ATTENTION DEFICIT HYPERACTIVITY DISORDER (ADHD), UNSPECIFIED ADHD TYPE: ICD-10-CM

## 2025-03-03 RX ORDER — DEXTROAMPHETAMINE SACCHARATE, AMPHETAMINE ASPARTATE MONOHYDRATE, DEXTROAMPHETAMINE SULFATE AND AMPHETAMINE SULFATE 7.5; 7.5; 7.5; 7.5 MG/1; MG/1; MG/1; MG/1
30 CAPSULE, EXTENDED RELEASE ORAL EVERY MORNING
Qty: 30 CAPSULE | Refills: 0 | Status: SHIPPED | OUTPATIENT
Start: 2025-03-03 | End: 2025-04-02

## 2025-03-03 NOTE — TELEPHONE ENCOUNTER
Pt called stating that they are all out of her Rx for Adderall XR at her local pharmacy. Can you please call in a new RX to [facility] pharmacy/[address] [phone]? Thanks

## 2025-03-15 DIAGNOSIS — Z30.41 ORAL CONTRACEPTIVE PILL SURVEILLANCE: ICD-10-CM

## 2025-03-17 DIAGNOSIS — M79.675 PAIN IN TOES OF BOTH FEET: ICD-10-CM

## 2025-03-17 DIAGNOSIS — M79.674 PAIN IN TOES OF BOTH FEET: ICD-10-CM

## 2025-03-17 RX ORDER — MELOXICAM 15 MG/1
TABLET ORAL
Qty: 30 TABLET | Refills: 0 | Status: SHIPPED | OUTPATIENT
Start: 2025-03-17

## 2025-03-18 RX ORDER — DROSPIRENONE AND ETHINYL ESTRADIOL 0.02-3(28)
1 KIT ORAL DAILY
Qty: 84 TABLET | Refills: 0 | Status: SHIPPED | OUTPATIENT
Start: 2025-03-18

## 2025-03-20 DIAGNOSIS — I10 BENIGN ESSENTIAL HYPERTENSION: ICD-10-CM

## 2025-03-20 DIAGNOSIS — K21.9 GASTROESOPHAGEAL REFLUX DISEASE, UNSPECIFIED WHETHER ESOPHAGITIS PRESENT: ICD-10-CM

## 2025-03-20 RX ORDER — AMLODIPINE BESYLATE 5 MG/1
5 TABLET ORAL DAILY
Qty: 90 TABLET | Refills: 0 | Status: SHIPPED | OUTPATIENT
Start: 2025-03-20

## 2025-03-20 RX ORDER — PANTOPRAZOLE SODIUM 20 MG/1
20 TABLET, DELAYED RELEASE ORAL DAILY
Qty: 90 TABLET | Refills: 0 | Status: SHIPPED | OUTPATIENT
Start: 2025-03-20

## 2025-03-31 ENCOUNTER — TELEPHONE (OUTPATIENT)
Dept: PRIMARY CARE | Facility: CLINIC | Age: 45
End: 2025-03-31
Payer: COMMERCIAL

## 2025-03-31 DIAGNOSIS — F90.9 ATTENTION DEFICIT HYPERACTIVITY DISORDER (ADHD), UNSPECIFIED ADHD TYPE: ICD-10-CM

## 2025-03-31 RX ORDER — DEXTROAMPHETAMINE SACCHARATE, AMPHETAMINE ASPARTATE MONOHYDRATE, DEXTROAMPHETAMINE SULFATE AND AMPHETAMINE SULFATE 7.5; 7.5; 7.5; 7.5 MG/1; MG/1; MG/1; MG/1
30 CAPSULE, EXTENDED RELEASE ORAL EVERY MORNING
Qty: 30 CAPSULE | Refills: 0 | Status: SHIPPED | OUTPATIENT
Start: 2025-03-31 | End: 2025-04-30

## 2025-03-31 NOTE — TELEPHONE ENCOUNTER
Rx Refill Request Telephone Encounter    Name:  Christine Miller  :  [id number]  Medication Name:    amphetamine-dextroamphetamine XR (Adderall XR) 30 mg 24 hr capsule     Specific Pharmacy location:    [id number] - [facility] [address] AT [facility]  Phone: [phone]   Fax: [phone]     Date of last appointment:  2024    Date of next appointment:  25

## 2025-04-21 ENCOUNTER — APPOINTMENT (OUTPATIENT)
Dept: PRIMARY CARE | Facility: CLINIC | Age: 45
End: 2025-04-21
Payer: COMMERCIAL

## 2025-04-21 VITALS
TEMPERATURE: 98.1 F | BODY MASS INDEX: 36.05 KG/M2 | HEART RATE: 91 BPM | WEIGHT: 210 LBS | DIASTOLIC BLOOD PRESSURE: 73 MMHG | SYSTOLIC BLOOD PRESSURE: 117 MMHG | OXYGEN SATURATION: 99 %

## 2025-04-21 DIAGNOSIS — F41.8 DEPRESSION WITH ANXIETY: ICD-10-CM

## 2025-04-21 DIAGNOSIS — I10 BENIGN ESSENTIAL HYPERTENSION: ICD-10-CM

## 2025-04-21 DIAGNOSIS — E78.2 MIXED HYPERLIPIDEMIA: ICD-10-CM

## 2025-04-21 DIAGNOSIS — R73.9 HYPERGLYCEMIA: ICD-10-CM

## 2025-04-21 DIAGNOSIS — E66.812 CLASS 2 SEVERE OBESITY DUE TO EXCESS CALORIES WITH SERIOUS COMORBIDITY AND BODY MASS INDEX (BMI) OF 36.0 TO 36.9 IN ADULT: ICD-10-CM

## 2025-04-21 DIAGNOSIS — K62.5 RECTAL BLEEDING: ICD-10-CM

## 2025-04-21 DIAGNOSIS — F98.8 ATTENTION DEFICIT DISORDER, UNSPECIFIED TYPE: Primary | ICD-10-CM

## 2025-04-21 DIAGNOSIS — R53.83 FATIGUE, UNSPECIFIED TYPE: ICD-10-CM

## 2025-04-21 DIAGNOSIS — Z79.899 LONG-TERM CURRENT USE OF STIMULANT: ICD-10-CM

## 2025-04-21 DIAGNOSIS — E66.01 CLASS 2 SEVERE OBESITY DUE TO EXCESS CALORIES WITH SERIOUS COMORBIDITY AND BODY MASS INDEX (BMI) OF 36.0 TO 36.9 IN ADULT: ICD-10-CM

## 2025-04-21 PROCEDURE — 3078F DIAST BP <80 MM HG: CPT | Performed by: FAMILY MEDICINE

## 2025-04-21 PROCEDURE — 99214 OFFICE O/P EST MOD 30 MIN: CPT | Performed by: FAMILY MEDICINE

## 2025-04-21 PROCEDURE — 3074F SYST BP LT 130 MM HG: CPT | Performed by: FAMILY MEDICINE

## 2025-04-21 PROCEDURE — 1036F TOBACCO NON-USER: CPT | Performed by: FAMILY MEDICINE

## 2025-04-21 ASSESSMENT — PATIENT HEALTH QUESTIONNAIRE - PHQ9
2. FEELING DOWN, DEPRESSED OR HOPELESS: NOT AT ALL
1. LITTLE INTEREST OR PLEASURE IN DOING THINGS: NOT AT ALL
SUM OF ALL RESPONSES TO PHQ9 QUESTIONS 1 AND 2: 0

## 2025-04-21 ASSESSMENT — ENCOUNTER SYMPTOMS: HYPERTENSION: 1

## 2025-04-21 NOTE — PROGRESS NOTES
Subjective   Patient ID: Christine Miller is a 44 y.o. female who presents for Follow-up and Hypertension.  Patient presents today for follow-up on her chronic medical problems.  She reports that overall she is doing well.  She has a [place] coming up in a few weeks.  She states that she has not been eating as healthy due to stress and has gained some weight but is trying to work on this.  She denies any chest pain or shortness of breath or abdominal pain.  Reports that her mood has been okay.  States her concentration and focus have been good with her current dose of Adderall.  She is due for urine tox screen.  She has not had blood work done in several years and I advised that she needs to have this done in order to continue prescribing her medications.  She also reports that she had an episode of rectal bleeding.  States that she had had some diarrhea and it occurred after this.  She has had this happen at least once in the past and I have recommended she see GI which she declines.  She denies any other concerns.  Hypertension      Social History     Socioeconomic History    Marital status: Single     Spouse name: Not on file    Number of children: Not on file    Years of education: Not on file    Highest education level: Not on file   Occupational History    Not on file   Tobacco Use    Smoking status: Never    Smokeless tobacco: Never   Vaping Use    Vaping status: Never Used   Substance and Sexual Activity    Alcohol use: Not Currently    Drug use: Never    Sexual activity: Never   Other Topics Concern    Not on file   Social History Narrative    Not on file     Social Drivers of Health     Financial Resource Strain: Not on file   Food Insecurity: Not on file   Transportation Needs: Not on file   Physical Activity: Not on file   Stress: Not on file   Social Connections: Not on file   Intimate Partner Violence: Not on file   Housing Stability: Not on file     Current Medications[1]  Family  History[2]  Review of Systems  Immunization History   Administered Date(s) Administered    COVID-19, mRNA, LNP-S, PF, 30 mcg/0.3 mL dose 01/15/2021, 02/05/2021    Flu vaccine (IIV4), preservative free *Check age/dose* 12/29/2016, 09/24/2018, 10/23/2019, 12/01/2019, 11/19/2020, 11/02/2021    Flu vaccine, quadrivalent, no egg protein, age 6 month or greater (FLUCELVAX) 08/10/2017, 09/22/2022    Influenza, Unspecified 09/08/2015    Influenza, injectable, quadrivalent 10/01/2018    Influenza, seasonal, intradermal, preservative free 10/03/2012, 11/08/2013, 10/01/2017    Moderna SARS-CoV-2 50mcg/0.5mL 12/31/2022    Moderna SARS-CoV-2 Vaccination 11/02/2021    Pfizer COVID-19 vaccine, bivalent, age 12 years and older (30 mcg/0.3 mL) 09/22/2022       Review of Systems negative except as noted in HPI and Chief complaint.     Objective                 /73 (BP Location: Left arm, Patient Position: Sitting)   Pulse 91   Temp 36.7 °C (98.1 °F) (Skin)   Wt 95.3 kg (210 lb)   SpO2 99%   BMI 36.05 kg/m²    Physical Exam  Vitals reviewed.   HENT:      Head: Normocephalic and atraumatic.      Right Ear: Tympanic membrane normal.      Left Ear: Tympanic membrane normal.      Nose: Nose normal.      Mouth/Throat:      Pharynx: Oropharynx is clear.   Eyes:      Extraocular Movements: Extraocular movements intact.      Conjunctiva/sclera: Conjunctivae normal.      Pupils: Pupils are equal, round, and reactive to light.   Cardiovascular:      Rate and Rhythm: Normal rate and regular rhythm.      Pulses: Normal pulses.   Pulmonary:      Effort: Pulmonary effort is normal.      Breath sounds: Normal breath sounds.   Abdominal:      General: There is no distension.      Palpations: Abdomen is soft.      Tenderness: There is no abdominal tenderness.   Musculoskeletal:         General: Normal range of motion.      Cervical back: Normal range of motion and neck supple.      Right lower leg: No edema.      Left lower leg: No edema.  "  Lymphadenopathy:      Cervical: No cervical adenopathy.   Neurological:      General: No focal deficit present.      Mental Status: She is alert.       No results found for this or any previous visit (from the past 96 hours).  Lab Results   Component Value Date    WBC 10.0 02/16/2021    HGB 13.9 02/16/2021    HCT 43.8 02/16/2021    MCV 93 02/16/2021     02/16/2021     Lab Results   Component Value Date    GLUCOSE 93 02/16/2021    CALCIUM 9.2 02/16/2021     02/16/2021    K 4.5 02/16/2021    CO2 26 02/16/2021     02/16/2021    BUN 12 02/16/2021    CREATININE 0.79 02/16/2021     Lab Results   Component Value Date    CHOL 261 (H) 02/16/2021    CHOL 286 (H) 04/18/2019     Lab Results   Component Value Date    HDL 51.0 02/16/2021    HDL 50.0 04/18/2019     No results found for: \"LDLCALC\"  Lab Results   Component Value Date    TRIG 409 (H) 02/16/2021    TRIG 345 (H) 04/18/2019     No components found for: \"CHOLHDL\"   Lab Results   Component Value Date    TSH 3.53 02/16/2021      Lab Results   Component Value Date    HGBA1C 5.9 02/16/2021      No results found for: \"ALBUMINUR\"   Assessment/Plan   Problem List Items Addressed This Visit       ADD (attention deficit disorder) - Primary    Symptoms controlled with current dose of Adderall.  [place]AR[place] was [place].  Controlled substance agreement signed in December 2024.  Urine tox screen was ordered.  Again recommend patient proceed with blood work as previously ordered.  Call for when due for next refill.         Benign essential hypertension    Blood pressure controlled.  Continue with amlodipine.  Again discussed importance of having blood work done to continue prescribing medications.         Relevant Orders    Albumin-Creatinine Ratio, Urine Random    Lipid Panel    Comprehensive Metabolic Panel    CBC and Auto Differential    Depression with anxiety    Symptoms controlled with current dose of fluoxetine.  Patient has also been seeing a counselor for " stress reduction techniques.         Relevant Orders    TSH with reflex to Free T4 if abnormal    Fatigue    Check blood work as ordered.         Relevant Orders    Vitamin B12    Hyperglycemia    Diet and exercise.  Check CMP and hemoglobin A1c.         Relevant Orders    Hemoglobin A1C    Mixed hyperlipidemia    And exercise.  Check CMP and lipids.         Rectal bleeding    Patient declined an exam today.  Again recommend checking blood work as previously ordered to evaluate for anemia and also recommend she see GI for possible colonoscopy which she declines.  I did review that this could be a sign of colorectal cancer.  Patient is aware and declines.         Class 2 severe obesity due to excess calories with serious comorbidity and body mass index (BMI) of 36.0 to 36.9 in adult    Long-term current use of stimulant    Relevant Orders    Opiate/Opioid/Benzo Prescription Compliance    Amphetamine Confirm, Urine                 [1]   Current Outpatient Medications   Medication Sig Dispense Refill    amLODIPine (Norvasc) 5 mg tablet TAKE 1 TABLET BY MOUTH DAILY AS DIRECTED 90 tablet 0    amphetamine-dextroamphetamine XR (Adderall XR) 30 mg 24 hr capsule Take 1 capsule (30 mg) by mouth once daily in the morning. Do not crush or chew. 30 capsule 0    drospirenone-ethinyl estradiol (Heidi) 3-0.02 mg tablet TAKE 1 TABLET BY MOUTH DAILY 84 tablet 0    fenofibrate (Tricor) 145 mg tablet TAKE 1 TABLET BY MOUTH EVERY DAY 90 tablet 0    FLUoxetine (PROzac) 40 mg capsule Take 2 capsules (80 mg) by mouth once daily. 180 capsule 1    meloxicam (Mobic) 15 mg tablet TAKE 1 TABLET DAILY BY MOUTH IF NEEDED FOR MODERATE PAIN 30 tablet 0    pantoprazole (ProtoNix) 20 mg EC tablet TAKE 1 TABLET BY MOUTH DAILY 90 tablet 0     No current facility-administered medications for this visit.   [2]   Family History  Problem Relation Name Age of Onset    Breast cancer Mother Karla

## 2025-04-21 NOTE — ASSESSMENT & PLAN NOTE
Patient declined an exam today.  Again recommend checking blood work as previously ordered to evaluate for anemia and also recommend she see GI for possible colonoscopy which she declines.  I did review that this could be a sign of colorectal cancer.  Patient is aware and declines.

## 2025-04-21 NOTE — ASSESSMENT & PLAN NOTE
Blood pressure controlled.  Continue with amlodipine.  Again discussed importance of having blood work done to continue prescribing medications.

## 2025-04-21 NOTE — ASSESSMENT & PLAN NOTE
Symptoms controlled with current dose of fluoxetine.  Patient has also been seeing a counselor for stress reduction techniques.   ARLEEN Powers made aware

## 2025-04-26 LAB
ALBUMIN SERPL-MCNC: 3.9 G/DL (ref 3.6–5.1)
ALP SERPL-CCNC: 26 U/L (ref 31–125)
ALT SERPL-CCNC: 14 U/L (ref 6–29)
ANION GAP SERPL CALCULATED.4IONS-SCNC: 6 MMOL/L (CALC) (ref 7–17)
AST SERPL-CCNC: 16 U/L (ref 10–30)
BASOPHILS # BLD AUTO: 52 CELLS/UL (ref 0–200)
BASOPHILS NFR BLD AUTO: 0.9 %
BILIRUB SERPL-MCNC: 0.3 MG/DL (ref 0.2–1.2)
BUN SERPL-MCNC: 17 MG/DL (ref 7–25)
CALCIUM SERPL-MCNC: 8.8 MG/DL (ref 8.6–10.2)
CHLORIDE SERPL-SCNC: 106 MMOL/L (ref 98–110)
CHOLEST SERPL-MCNC: 188 MG/DL
CHOLEST/HDLC SERPL: 2.1 (CALC)
CO2 SERPL-SCNC: 25 MMOL/L (ref 20–32)
CREAT SERPL-MCNC: 0.81 MG/DL (ref 0.5–0.99)
EGFRCR SERPLBLD CKD-EPI 2021: 93 ML/MIN/1.73M2
EOSINOPHIL # BLD AUTO: 110 CELLS/UL (ref 15–500)
EOSINOPHIL NFR BLD AUTO: 1.9 %
ERYTHROCYTE [DISTWIDTH] IN BLOOD BY AUTOMATED COUNT: 12.9 % (ref 11–15)
EST. AVERAGE GLUCOSE BLD GHB EST-MCNC: 111 MG/DL
EST. AVERAGE GLUCOSE BLD GHB EST-SCNC: 6.2 MMOL/L
GLUCOSE SERPL-MCNC: 89 MG/DL (ref 65–99)
HBA1C MFR BLD: 5.5 %
HCT VFR BLD AUTO: 37.1 % (ref 35–45)
HDLC SERPL-MCNC: 89 MG/DL
HGB BLD-MCNC: 12.5 G/DL (ref 11.7–15.5)
LDLC SERPL CALC-MCNC: 84 MG/DL (CALC)
LYMPHOCYTES # BLD AUTO: 2326 CELLS/UL (ref 850–3900)
LYMPHOCYTES NFR BLD AUTO: 40.1 %
MCH RBC QN AUTO: 30 PG (ref 27–33)
MCHC RBC AUTO-ENTMCNC: 33.7 G/DL (ref 32–36)
MCV RBC AUTO: 89.2 FL (ref 80–100)
MONOCYTES # BLD AUTO: 441 CELLS/UL (ref 200–950)
MONOCYTES NFR BLD AUTO: 7.6 %
NEUTROPHILS # BLD AUTO: 2871 CELLS/UL (ref 1500–7800)
NEUTROPHILS NFR BLD AUTO: 49.5 %
NONHDLC SERPL-MCNC: 99 MG/DL (CALC)
PLATELET # BLD AUTO: 444 THOUSAND/UL (ref 140–400)
PMV BLD REES-ECKER: 9.7 FL (ref 7.5–12.5)
POTASSIUM SERPL-SCNC: 4.7 MMOL/L (ref 3.5–5.3)
PROT SERPL-MCNC: 6.3 G/DL (ref 6.1–8.1)
RBC # BLD AUTO: 4.16 MILLION/UL (ref 3.8–5.1)
SODIUM SERPL-SCNC: 137 MMOL/L (ref 135–146)
TRIGL SERPL-MCNC: 66 MG/DL
TSH SERPL-ACNC: 2.36 MIU/L
VIT B12 SERPL-MCNC: 266 PG/ML (ref 200–1100)
WBC # BLD AUTO: 5.8 THOUSAND/UL (ref 3.8–10.8)

## 2025-04-28 DIAGNOSIS — E53.8 VITAMIN B12 DEFICIENCY: Primary | ICD-10-CM

## 2025-04-28 DIAGNOSIS — F90.9 ATTENTION DEFICIT HYPERACTIVITY DISORDER (ADHD), UNSPECIFIED ADHD TYPE: ICD-10-CM

## 2025-04-28 RX ORDER — LANOLIN ALCOHOL/MO/W.PET/CERES
1000 CREAM (GRAM) TOPICAL DAILY
Qty: 90 TABLET | Refills: 1 | Status: SHIPPED | OUTPATIENT
Start: 2025-04-28 | End: 2025-10-25

## 2025-04-29 ENCOUNTER — TELEPHONE (OUTPATIENT)
Dept: PRIMARY CARE | Facility: CLINIC | Age: 45
End: 2025-04-29
Payer: COMMERCIAL

## 2025-04-29 RX ORDER — DEXTROAMPHETAMINE SACCHARATE, AMPHETAMINE ASPARTATE MONOHYDRATE, DEXTROAMPHETAMINE SULFATE AND AMPHETAMINE SULFATE 7.5; 7.5; 7.5; 7.5 MG/1; MG/1; MG/1; MG/1
30 CAPSULE, EXTENDED RELEASE ORAL EVERY MORNING
Qty: 30 CAPSULE | Refills: 0 | Status: SHIPPED | OUTPATIENT
Start: 2025-05-01 | End: 2025-05-31

## 2025-04-29 NOTE — TELEPHONE ENCOUNTER
Patient is leaving this Friday morning for [place] and would like to get her medication refilled before she leaves. However she said that the pharmacy requires a note from the doctor/ RX order for her them to fill her amphetamine-dextroamphetamine XR (Adderall XR) 30 mg 24 hr capsule earlier than it is due to be filled. Preferably a day early since she will run out on Friday. Please advise  [phone]

## 2025-05-28 ENCOUNTER — TELEPHONE (OUTPATIENT)
Dept: PRIMARY CARE | Facility: CLINIC | Age: 45
End: 2025-05-28
Payer: COMMERCIAL

## 2025-05-28 DIAGNOSIS — E78.5 HYPERLIPIDEMIA, UNSPECIFIED HYPERLIPIDEMIA TYPE: ICD-10-CM

## 2025-05-28 DIAGNOSIS — F90.9 ATTENTION DEFICIT HYPERACTIVITY DISORDER (ADHD), UNSPECIFIED ADHD TYPE: ICD-10-CM

## 2025-05-28 RX ORDER — DEXTROAMPHETAMINE SACCHARATE, AMPHETAMINE ASPARTATE MONOHYDRATE, DEXTROAMPHETAMINE SULFATE AND AMPHETAMINE SULFATE 7.5; 7.5; 7.5; 7.5 MG/1; MG/1; MG/1; MG/1
30 CAPSULE, EXTENDED RELEASE ORAL EVERY MORNING
Qty: 30 CAPSULE | Refills: 0 | Status: SHIPPED | OUTPATIENT
Start: 2025-05-28 | End: 2025-06-27

## 2025-05-28 RX ORDER — FENOFIBRATE 145 MG/1
145 TABLET, FILM COATED ORAL DAILY
Qty: 90 TABLET | Refills: 1 | Status: SHIPPED | OUTPATIENT
Start: 2025-05-28

## 2025-05-28 NOTE — TELEPHONE ENCOUNTER
Rx Refill Request Telephone Encounter    Name:  Christine Miller  :  84  Medication Name:    amphetamine-dextroamphetamine XR (Adderall XR) 30 mg 24 hr capsule  Specific Pharm[facility]    [place] [address] [facility] [address]  Phone: [phone]  Fax: [phone]     Date of last appointment:  25  Date of next appointment:    Best number to reach patient:  [phone]

## 2025-06-21 DIAGNOSIS — Z30.41 ORAL CONTRACEPTIVE PILL SURVEILLANCE: ICD-10-CM

## 2025-06-23 RX ORDER — DROSPIRENONE AND ETHINYL ESTRADIOL 0.02-3(28)
1 KIT ORAL DAILY
Qty: 84 TABLET | Refills: 0 | Status: SHIPPED | OUTPATIENT
Start: 2025-06-23

## 2025-06-29 DIAGNOSIS — F41.8 DEPRESSION WITH ANXIETY: ICD-10-CM

## 2025-07-01 RX ORDER — FLUOXETINE HYDROCHLORIDE 40 MG/1
CAPSULE ORAL
Qty: 180 CAPSULE | Refills: 1 | Status: SHIPPED | OUTPATIENT
Start: 2025-07-01

## 2025-07-02 ENCOUNTER — TELEPHONE (OUTPATIENT)
Dept: CARDIOLOGY | Facility: CLINIC | Age: 45
End: 2025-07-02
Payer: COMMERCIAL

## 2025-07-02 DIAGNOSIS — F90.9 ATTENTION DEFICIT HYPERACTIVITY DISORDER (ADHD), UNSPECIFIED ADHD TYPE: ICD-10-CM

## 2025-07-02 RX ORDER — DEXTROAMPHETAMINE SACCHARATE, AMPHETAMINE ASPARTATE MONOHYDRATE, DEXTROAMPHETAMINE SULFATE AND AMPHETAMINE SULFATE 7.5; 7.5; 7.5; 7.5 MG/1; MG/1; MG/1; MG/1
30 CAPSULE, EXTENDED RELEASE ORAL EVERY MORNING
Qty: 30 CAPSULE | Refills: 0 | Status: SHIPPED | OUTPATIENT
Start: 2025-07-02 | End: 2025-07-03 | Stop reason: SDUPTHER

## 2025-07-02 NOTE — TELEPHONE ENCOUNTER
Requesting refill on Adderall XR 30mg 24Hr capsule to be sent to   [facility] [place] [phone]       Pending appt on 7/22/25

## 2025-07-03 DIAGNOSIS — F90.9 ATTENTION DEFICIT HYPERACTIVITY DISORDER (ADHD), UNSPECIFIED ADHD TYPE: ICD-10-CM

## 2025-07-03 RX ORDER — DEXTROAMPHETAMINE SACCHARATE, AMPHETAMINE ASPARTATE MONOHYDRATE, DEXTROAMPHETAMINE SULFATE AND AMPHETAMINE SULFATE 7.5; 7.5; 7.5; 7.5 MG/1; MG/1; MG/1; MG/1
30 CAPSULE, EXTENDED RELEASE ORAL EVERY MORNING
Qty: 30 CAPSULE | Refills: 0 | Status: SHIPPED | OUTPATIENT
Start: 2025-07-03 | End: 2025-08-02

## 2025-07-03 NOTE — TELEPHONE ENCOUNTER
Pt states her pharmacy is out of stock and now needs this medication sent to the new pharmacy please

## 2025-07-11 ENCOUNTER — OFFICE VISIT (OUTPATIENT)
Dept: SURGICAL ONCOLOGY | Facility: HOSPITAL | Age: 45
End: 2025-07-11
Payer: COMMERCIAL

## 2025-07-11 ENCOUNTER — HOSPITAL ENCOUNTER (OUTPATIENT)
Dept: RADIOLOGY | Facility: HOSPITAL | Age: 45
Discharge: HOME | End: 2025-07-11
Payer: COMMERCIAL

## 2025-07-11 VITALS
HEIGHT: 64 IN | WEIGHT: 202 LBS | HEART RATE: 80 BPM | SYSTOLIC BLOOD PRESSURE: 158 MMHG | DIASTOLIC BLOOD PRESSURE: 99 MMHG | RESPIRATION RATE: 16 BRPM | BODY MASS INDEX: 34.49 KG/M2

## 2025-07-11 DIAGNOSIS — Z12.39 BREAST CANCER SCREENING, HIGH RISK PATIENT: ICD-10-CM

## 2025-07-11 DIAGNOSIS — Z12.39 BREAST CANCER SCREENING, HIGH RISK PATIENT: Primary | ICD-10-CM

## 2025-07-11 PROCEDURE — 3008F BODY MASS INDEX DOCD: CPT | Performed by: NURSE PRACTITIONER

## 2025-07-11 PROCEDURE — 99214 OFFICE O/P EST MOD 30 MIN: CPT | Performed by: NURSE PRACTITIONER

## 2025-07-11 PROCEDURE — 3077F SYST BP >= 140 MM HG: CPT | Performed by: NURSE PRACTITIONER

## 2025-07-11 PROCEDURE — 1036F TOBACCO NON-USER: CPT | Performed by: NURSE PRACTITIONER

## 2025-07-11 PROCEDURE — 77067 SCR MAMMO BI INCL CAD: CPT

## 2025-07-11 PROCEDURE — 3080F DIAST BP >= 90 MM HG: CPT | Performed by: NURSE PRACTITIONER

## 2025-07-11 ASSESSMENT — ENCOUNTER SYMPTOMS
MUSCULOSKELETAL NEGATIVE: 1
ENDOCRINE NEGATIVE: 1
NEUROLOGICAL NEGATIVE: 1
HEMATOLOGIC/LYMPHATIC NEGATIVE: 1
CONSTITUTIONAL NEGATIVE: 1
RESPIRATORY NEGATIVE: 1
PSYCHIATRIC NEGATIVE: 1
CARDIOVASCULAR NEGATIVE: 1
EYES NEGATIVE: 1
GASTROINTESTINAL NEGATIVE: 1

## 2025-07-11 NOTE — PROGRESS NOTES
[facility] [place]  Christine Miller female   1980 44 y.o.   [id number]      Chief Complaint  Annual mammogram and exam.    History Of Present Illness  Christine Miller is a pleasant 44 y.o.  female followed in the breast center for high risk surveillance care. She denies breast surgery or biopsy. She has a personal history of gastric bypass surgery and has lost 150 lbs. She has a family history of breast cancer in her mother, age 42. She presents today for annual mammogram and exam.     BREAST IMAGIN2024 Bilateral screening mammogram, indicates BI-RADS Category 1.     REPRODUCTIVE HISTORY:  menarche age 15, nulliparous, OCP's x 4 years, premenopausal, entirely fatty tissue     FAMILY CANCER HISTORY:  Mother: Breast cancer, age 42 (metastatic, had chemo/radiation)     Review of Systems  Review of Systems   Constitutional: Negative.    HENT:  Negative.     Eyes: Negative.    Respiratory: Negative.     Cardiovascular: Negative.    Gastrointestinal: Negative.    Endocrine: Negative.    Genitourinary: Negative.     Musculoskeletal: Negative.    Skin: Negative.    Neurological: Negative.    Hematological: Negative.    Psychiatric/Behavioral: Negative.         Past Medical History  She has a past medical history of COVID-19 (2023).    Surgical History  She has a past surgical history that includes Other surgical history (2022); Other surgical history (2022); and Gastric bypass (22).     Family History  Cancer-related family history includes Breast cancer in her mother.     Social History  She reports that she has never smoked. She has never used smokeless tobacco. She reports that she does not currently use alcohol. She reports that she does not use drugs.    Allergies  Patient has no known allergies.    Medications  Current Outpatient Medications   Medication Instructions    amLODIPine (NORVASC) 5 mg, oral, Daily, as directed    amphetamine-dextroamphetamine XR (Adderall XR)  30 mg 24 hr capsule 30 mg, oral, Every morning, Do not crush or chew.    cyanocobalamin (VITAMIN B-12) 1,000 mcg, oral, Daily    drospirenone-ethinyl estradiol (Heidi) 3-0.02 mg tablet 1 tablet, oral, Daily    fenofibrate (TRICOR) 145 mg, oral, Daily    FLUoxetine (PROzac) 40 mg capsule TAKE 2 CAPSULES(80 MG) BY MOUTH DAILY    meloxicam (Mobic) 15 mg tablet TAKE 1 TABLET DAILY BY MOUTH IF NEEDED FOR MODERATE PAIN    pantoprazole (PROTONIX) 20 mg, oral, Daily       Last Recorded Vitals  Vitals:    07/11/25 0853   BP: (!) 158/99   Pulse: 80   Resp: 16       Physical Exam  Patient is alert and oriented x3 and in a relaxed and appropriate mood. Her gait is steady and hand grasps are equal. Sclera is clear. The breasts are nearly symmetrical. The tissue is soft without palpable abnormalities, discrete nodules or masses. The skin and nipples appear normal. There is no cervical, supraclavicular or axillary lymphadenopathy.          Relevant Results and Imaging  Pending      Risk Models          Orders  Orders Placed This Encounter   Procedures    BI mammo bilateral screening tomosynthesis     Standing Status:   Future     Expected Date:   7/12/2026     Expiration Date:   8/11/2026     Reason for exam::   annual screening mammogram     Radiologist to Determine Optimal Study:   Yes     Release result to Prim Laundry:   Immediate     Is this exam part of a Research Study? If Yes, link this order to the research study:   No       Visit Diagnosis  1. Breast cancer screening, high risk patient  Clinic Appointment Request Follow Up    Clinic Appointment Request Follow Up (high risk surveillance)    BI mammo bilateral screening tomosynthesis          Assessment/Plan  High risk surveillance care, normal clinical exam, family history of breast cancer, entirely fatty tissue     Plan:  Return July 2026 for bilateral screening mammogram and office visit. Endocrine therapy not strongly recommended at this time. Prefers annual screening with  mammogram.    Patient Discussion/Summary  Your clinical examination is normal. You had a screening mammogram today and the results are pending. I will inform you if the screening mammogram is abnormal. Please return in one year for a screening mammogram and office visit or sooner if you have any questions or concerns.       High risk breast surveillance care plan:  Yearly mammogram with digital breast tomosynthesis  Twice yearly clinical breast examinations  Breast MRI (to schedule call [phone])  Monthly self breast examinations &/or regular self breast awareness  Vitamin D3 2000 IU/daily (over the counter) unless your PCP recommends you take a specific dose  Exercise 3-4 times per week for 45-60 minutes  Limit alcohol to 3-4 drinks per week if you are menopausal  Eat healthy low-fat diet with lots of vegetable & fruits      IMPORTANT INFORMATION REGARDING YOUR RESULTS    You can see your health information, review clinical summaries from office visits & test results online when you follow your health with MY  Chart, a personal health record. To sign up go to [email or web address]. If you need assistance with signing up or trouble getting into your account call [facility] Patient Line 24/7 at [phone].    My office phone number is [phone] if you need to get in touch with me or have additional questions or concerns. Thank you for choosing [facility] and trusting me as your healthcare provider. I look forward to seeing you again at your next office visit. I am honored to be a provider on your health care team and I remain dedicated to helping you achieve your health goals.    Criselda Barker, RADHA-CNP

## 2025-07-15 PROBLEM — R73.9 HYPERGLYCEMIA: Status: RESOLVED | Noted: 2023-12-21 | Resolved: 2025-07-15

## 2025-07-22 ENCOUNTER — APPOINTMENT (OUTPATIENT)
Dept: PRIMARY CARE | Facility: CLINIC | Age: 45
End: 2025-07-22
Payer: COMMERCIAL

## 2025-07-22 VITALS
HEIGHT: 64 IN | SYSTOLIC BLOOD PRESSURE: 128 MMHG | DIASTOLIC BLOOD PRESSURE: 84 MMHG | HEART RATE: 111 BPM | BODY MASS INDEX: 36.37 KG/M2 | WEIGHT: 213 LBS | TEMPERATURE: 97 F

## 2025-07-22 DIAGNOSIS — F98.8 ATTENTION DEFICIT DISORDER, UNSPECIFIED TYPE: ICD-10-CM

## 2025-07-22 DIAGNOSIS — I10 BENIGN ESSENTIAL HYPERTENSION: Primary | ICD-10-CM

## 2025-07-22 DIAGNOSIS — M25.561 ACUTE PAIN OF RIGHT KNEE: ICD-10-CM

## 2025-07-22 DIAGNOSIS — E66.01 CLASS 2 SEVERE OBESITY DUE TO EXCESS CALORIES WITH SERIOUS COMORBIDITY AND BODY MASS INDEX (BMI) OF 36.0 TO 36.9 IN ADULT: ICD-10-CM

## 2025-07-22 DIAGNOSIS — R22.42 LOWER LEG MASS, LEFT: ICD-10-CM

## 2025-07-22 DIAGNOSIS — F41.8 DEPRESSION WITH ANXIETY: ICD-10-CM

## 2025-07-22 DIAGNOSIS — M79.674 PAIN OF TOE OF RIGHT FOOT: ICD-10-CM

## 2025-07-22 DIAGNOSIS — Z79.899 LONG-TERM CURRENT USE OF STIMULANT: ICD-10-CM

## 2025-07-22 DIAGNOSIS — E66.812 CLASS 2 SEVERE OBESITY DUE TO EXCESS CALORIES WITH SERIOUS COMORBIDITY AND BODY MASS INDEX (BMI) OF 36.0 TO 36.9 IN ADULT: ICD-10-CM

## 2025-07-22 DIAGNOSIS — N92.6 IRREGULAR MENSTRUAL CYCLE: ICD-10-CM

## 2025-07-22 PROCEDURE — 3079F DIAST BP 80-89 MM HG: CPT | Performed by: FAMILY MEDICINE

## 2025-07-22 PROCEDURE — 3008F BODY MASS INDEX DOCD: CPT | Performed by: FAMILY MEDICINE

## 2025-07-22 PROCEDURE — 99214 OFFICE O/P EST MOD 30 MIN: CPT | Performed by: FAMILY MEDICINE

## 2025-07-22 PROCEDURE — 1036F TOBACCO NON-USER: CPT | Performed by: FAMILY MEDICINE

## 2025-07-22 PROCEDURE — 3074F SYST BP LT 130 MM HG: CPT | Performed by: FAMILY MEDICINE

## 2025-07-22 RX ORDER — AMLODIPINE BESYLATE 5 MG/1
5 TABLET ORAL DAILY
Qty: 90 TABLET | Refills: 1 | Status: SHIPPED | OUTPATIENT
Start: 2025-07-22

## 2025-07-22 ASSESSMENT — PATIENT HEALTH QUESTIONNAIRE - PHQ9
2. FEELING DOWN, DEPRESSED OR HOPELESS: SEVERAL DAYS
SUM OF ALL RESPONSES TO PHQ9 QUESTIONS 1 AND 2: 1
1. LITTLE INTEREST OR PLEASURE IN DOING THINGS: NOT AT ALL

## 2025-07-22 NOTE — ASSESSMENT & PLAN NOTE
Patient reports she has had this for about a year.  Saw another physician and ultrasound was ordered which showed a probable hematoma.  Patient reports this may have decreased in size a little but has not resolved.  Plan to repeat ultrasound and will also have her see orthopedics for further evaluation.

## 2025-07-22 NOTE — ASSESSMENT & PLAN NOTE
Symptoms well-controlled with current dose of Adderall.  [place]AR[place] was reviewed.  Controlled substance agreement was signed in December.  Urine tox screen obtained today.  Follow-up in 3 months.  Call for refills when needed.

## 2025-07-22 NOTE — PROGRESS NOTES
Subjective   Patient ID: Christine Miller is a 44 y.o. female who presents for Follow-up (Follow up).  Patient presents today for follow-up on her chronic medical problems.  She reports that overall she has been doing okay.  She has been having pain in her knee.  She states that her right knee will swell up and she feels like it is giving out on her.  She denies any injury.  States this has been going on for few months.  She also has a lump on the back of her left leg.  States that this has been there for a while.  She did have an ultrasound done last year at that reported this is a hematoma.  She states it is not painful but it has not resolved.  She also reports that her toes have been bothering her.  She feels like they have been curling under.  She saw a podiatrist but would like to see a different 1.  She denies any chest pain or shortness of breath or abdominal pain.  States that she has not been following her diet quite as well but is going to work on this.  Reports that concentration and focus have been good with her current dose of Adderall.  She reports that her periods have been irregular.  She states that they have been like this since her bariatric surgery a few years ago.  She has not seen gynecology recently.  She denies any other concerns  HPI  Social History     Socioeconomic History    Marital status: Single     Spouse name: Not on file    Number of children: Not on file    Years of education: Not on file    Highest education level: Not on file   Occupational History    Not on file   Tobacco Use    Smoking status: Never    Smokeless tobacco: Never   Vaping Use    Vaping status: Never Used   Substance and Sexual Activity    Alcohol use: Not Currently    Drug use: Never    Sexual activity: Never   Other Topics Concern    Not on file   Social History Narrative    Not on file     Social Drivers of Health     Financial Resource Strain: Not on file   Food Insecurity: Not on file   Transportation Needs: Not  "on file   Physical Activity: Not on file   Stress: Not on file   Social Connections: Not on file   Intimate Partner Violence: Not on file   Housing Stability: Not on file     Current Medications[1]  Family History[2]  Review of Systems  Immunization History   Administered Date(s) Administered    COVID-19, mRNA, LNP-S, PF, 30 mcg/0.3 mL dose 01/15/2021, 02/05/2021    Flu vaccine (IIV4), preservative free *Check age/dose* 12/29/2016, 09/24/2018, 10/23/2019, 12/01/2019, 11/19/2020, 11/02/2021    Flu vaccine, quadrivalent, no egg protein, age 6 month or greater (FLUCELVAX) 08/10/2017, 09/22/2022    Influenza, Unspecified 09/08/2015    Influenza, injectable, quadrivalent 10/01/2018    Influenza, seasonal, intradermal, preservative free 10/03/2012, 11/08/2013, 10/01/2017    Moderna SARS-CoV-2 50mcg/0.5mL 12/31/2022    Moderna SARS-CoV-2 Vaccination 11/02/2021    Pfizer COVID-19 vaccine, bivalent, age 12 years and older (30 mcg/0.3 mL) 09/22/2022       Review of Systems negative except as noted in HPI and Chief complaint.     Objective                 /84 (BP Location: Left arm, Patient Position: Sitting)   Pulse (!) 111   Temp 36.1 °C (97 °F)   Ht 1.626 m (5' 4\")   Wt 96.6 kg (213 lb)   BMI 36.56 kg/m²    Physical Exam  Vitals reviewed.   HENT:      Head: Normocephalic and atraumatic.      Right Ear: Tympanic membrane normal.      Left Ear: Tympanic membrane normal.      Nose: Nose normal.      Mouth/Throat:      Pharynx: Oropharynx is clear.     Eyes:      Extraocular Movements: Extraocular movements intact.      Conjunctiva/sclera: Conjunctivae normal.      Pupils: Pupils are equal, round, and reactive to light.       Cardiovascular:      Rate and Rhythm: Normal rate and regular rhythm.      Pulses: Normal pulses.   Pulmonary:      Effort: Pulmonary effort is normal.      Breath sounds: Normal breath sounds.   Abdominal:      General: There is no distension.      Palpations: Abdomen is soft.      Tenderness: " "There is no abdominal tenderness.     Musculoskeletal:         General: Normal range of motion.      Cervical back: Normal range of motion and neck supple.      Right lower leg: No edema.      Left lower leg: No edema.      Comments: Patient has a about a 3 x 4 cm mass posterior upper calf on the left.  No swelling or tenderness otherwise.   Lymphadenopathy:      Cervical: No cervical adenopathy.     Neurological:      General: No focal deficit present.      Mental Status: She is alert.       No results found for this or any previous visit (from the past 96 hours).  Lab Results   Component Value Date    WBC 5.8 04/25/2025    HGB 12.5 04/25/2025    HCT 37.1 04/25/2025    MCV 89.2 04/25/2025     (H) 04/25/2025     Lab Results   Component Value Date    GLUCOSE 89 04/25/2025    CALCIUM 8.8 04/25/2025     04/25/2025    K 4.7 04/25/2025    CO2 25 04/25/2025     04/25/2025    BUN 17 04/25/2025    CREATININE 0.81 04/25/2025     Lab Results   Component Value Date    CHOL 188 04/25/2025    CHOL 261 (H) 02/16/2021    CHOL 286 (H) 04/18/2019     Lab Results   Component Value Date    HDL 89 04/25/2025    HDL 51.0 02/16/2021    HDL 50.0 04/18/2019     Lab Results   Component Value Date    LDLCALC 84 04/25/2025     Lab Results   Component Value Date    TRIG 66 04/25/2025    TRIG 409 (H) 02/16/2021    TRIG 345 (H) 04/18/2019     No components found for: \"CHOLHDL\"   Lab Results   Component Value Date    TSH 2.36 04/25/2025      Lab Results   Component Value Date    HGBA1C 5.5 04/25/2025      No results found for: \"ALBUMINUR\"   Assessment/Plan   Problem List Items Addressed This Visit       ADD (attention deficit disorder)    Symptoms well-controlled with current dose of Adderall.  [place] was [place].  Controlled substance agreement was signed in December.  Urine tox screen obtained today.  Follow-up in 3 months.  Call for refills when needed.         Benign essential hypertension - Primary    Blood pressure " well-controlled.  Continue current dose of amlodipine.         Relevant Medications    amLODIPine (Norvasc) 5 mg tablet    Other Relevant Orders    Albumin-Creatinine Ratio, Urine Random    Depression with anxiety    Symptoms overall controlled.  Continue with current dose of fluoxetine.         Irregular menstrual cycle    Recommend checking pelvic ultrasound and pelvic exam which patient declines.  I did put in a referral for gynecology.         Relevant Orders    Referral to Gynecology    Class 2 severe obesity due to excess calories with serious comorbidity and body mass index (BMI) of 36.0 to 36.9 in adult    Pain of toe of right foot    Patient to see podiatry for further evaluation         Relevant Orders    Referral to Podiatry    Long-term current use of stimulant    Relevant Orders    Opiate/Opioid/Benzo Prescription Compliance    Amphetamine Confirm, Urine    Acute pain of right knee    Patient can use Tylenol as needed.  See orthopedics for further evaluation.         Relevant Orders    Referral to Orthopedics and Sports Medicine    Lower leg mass, left    Patient reports she has had this for about a year.  Saw another physician and ultrasound was ordered which showed a probable hematoma.  Patient reports this may have decreased in size a little but has not resolved.  Plan to repeat ultrasound and will also have her see orthopedics for further evaluation.         Relevant Orders    Referral to Orthopedics and Sports Medicine    Lower extremity venous duplex left                 [1]   Current Outpatient Medications   Medication Sig Dispense Refill    amphetamine-dextroamphetamine XR (Adderall XR) 30 mg 24 hr capsule Take 1 capsule (30 mg) by mouth once daily in the morning. Do not crush or chew. 30 capsule 0    cyanocobalamin (Vitamin B-12) 1,000 mcg tablet Take 1 tablet (1,000 mcg) by mouth once daily. 90 tablet 1    drospirenone-ethinyl estradiol (Heidi) 3-0.02 mg tablet TAKE 1 TABLET BY MOUTH DAILY 84  tablet 0    fenofibrate (Tricor) 145 mg tablet TAKE 1 TABLET BY MOUTH EVERY DAY 90 tablet 1    FLUoxetine (PROzac) 40 mg capsule TAKE 2 CAPSULES(80 MG) BY MOUTH DAILY 180 capsule 1    pantoprazole (ProtoNix) 20 mg EC tablet TAKE 1 TABLET BY MOUTH DAILY 90 tablet 0    amLODIPine (Norvasc) 5 mg tablet Take 1 tablet (5 mg) by mouth once daily. as directed 90 tablet 1     No current facility-administered medications for this visit.   [2]   Family History  Problem Relation Name Age of Onset    Breast cancer Mother Karla

## 2025-07-22 NOTE — ASSESSMENT & PLAN NOTE
Recommend checking pelvic ultrasound and pelvic exam which patient declines.  I did put in a referral for gynecology.

## 2025-07-26 LAB
1OH-MIDAZOLAM UR-MCNC: NEGATIVE NG/ML
7AMINOCLONAZEPAM UR-MCNC: NEGATIVE NG/ML
A-OH ALPRAZ UR-MCNC: NEGATIVE NG/ML
A-OH-TRIAZOLAM UR-MCNC: NEGATIVE NG/ML
AMPHET UR-MCNC: 8687 NG/ML
AMPHET UR-MCNC: >4000 NG/ML
AMPHETAMINES UR QL: POSITIVE NG/ML
BARBITURATES UR QL: NEGATIVE NG/ML
BZE UR QL: NEGATIVE NG/ML
CODEINE UR-MCNC: NEGATIVE NG/ML
CREAT UR-MCNC: 122.9 MG/DL
DRUG SCREEN COMMENT UR-IMP: ABNORMAL
EDDP UR-MCNC: NEGATIVE NG/ML
FENTANYL UR-MCNC: ABNORMAL NG/ML
HYDROCODONE UR-MCNC: NEGATIVE NG/ML
HYDROMORPHONE UR-MCNC: NEGATIVE NG/ML
LORAZEPAM UR-MCNC: NEGATIVE NG/ML
MDA UR-MCNC: NEGATIVE NG/ML
MDEA UR-MCNC: NEGATIVE NG/ML
MDMA UR-MCNC: NEGATIVE NG/ML
METHADONE UR-MCNC: NEGATIVE NG/ML
METHAMPHET UR-MCNC: NEGATIVE NG/ML
METHAMPHET UR-MCNC: NEGATIVE NG/ML
MORPHINE UR-MCNC: NEGATIVE NG/ML
NORDIAZEPAM UR-MCNC: NEGATIVE NG/ML
NORFENTANYL UR-MCNC: ABNORMAL NG/ML
NORHYDROCODONE UR CFM-MCNC: NEGATIVE NG/ML
NOROXYCODONE UR CFM-MCNC: NEGATIVE NG/ML
NORTRAMADOL UR-MCNC: NEGATIVE NG/ML
OH-ETHYLFLURAZ UR-MCNC: NEGATIVE NG/ML
OXAZEPAM UR-MCNC: NEGATIVE NG/ML
OXIDANTS UR QL: NEGATIVE MCG/ML
OXYCODONE UR CFM-MCNC: NEGATIVE NG/ML
OXYMORPHONE UR CFM-MCNC: NEGATIVE NG/ML
PCP UR QL: NEGATIVE NG/ML
PH UR: 6.2 [PH] (ref 4.5–9)
PHENTERMINE UR-MCNC: NEGATIVE NG/ML
QUEST 6 ACETYLMORPHINE: ABNORMAL
QUEST NOTES AND COMMENTS: ABNORMAL
QUEST PATIENT HISTORICAL REPORT: ABNORMAL
QUEST ZOLPIDEM: ABNORMAL
TEMAZEPAM UR-MCNC: NEGATIVE NG/ML
THC UR QL: NEGATIVE NG/ML
TRAMADOL UR-MCNC: NEGATIVE NG/ML
ZOLPIDEM PHENYL-4-CARB UR CFM-MCNC: ABNORMAL NG/ML

## 2025-07-28 LAB
1OH-MIDAZOLAM UR-MCNC: NEGATIVE NG/ML
7AMINOCLONAZEPAM UR-MCNC: NEGATIVE NG/ML
A-OH ALPRAZ UR-MCNC: NEGATIVE NG/ML
A-OH-TRIAZOLAM UR-MCNC: NEGATIVE NG/ML
AMPHET UR-MCNC: 8687 NG/ML
AMPHET UR-MCNC: >4000 NG/ML
AMPHETAMINES UR QL: POSITIVE NG/ML
BARBITURATES UR QL: NEGATIVE NG/ML
BZE UR QL: NEGATIVE NG/ML
CODEINE UR-MCNC: NEGATIVE NG/ML
CREAT UR-MCNC: 122.9 MG/DL
DRUG SCREEN COMMENT UR-IMP: ABNORMAL
EDDP UR-MCNC: NEGATIVE NG/ML
FENTANYL UR-MCNC: NEGATIVE NG/ML
HYDROCODONE UR-MCNC: NEGATIVE NG/ML
HYDROMORPHONE UR-MCNC: NEGATIVE NG/ML
LORAZEPAM UR-MCNC: NEGATIVE NG/ML
MDA UR-MCNC: NEGATIVE NG/ML
MDEA UR-MCNC: NEGATIVE NG/ML
MDMA UR-MCNC: NEGATIVE NG/ML
METHADONE UR-MCNC: NEGATIVE NG/ML
METHAMPHET UR-MCNC: NEGATIVE NG/ML
METHAMPHET UR-MCNC: NEGATIVE NG/ML
MORPHINE UR-MCNC: NEGATIVE NG/ML
NORDIAZEPAM UR-MCNC: NEGATIVE NG/ML
NORFENTANYL UR-MCNC: NEGATIVE NG/ML
NORHYDROCODONE UR CFM-MCNC: NEGATIVE NG/ML
NOROXYCODONE UR CFM-MCNC: NEGATIVE NG/ML
NORTRAMADOL UR-MCNC: NEGATIVE NG/ML
OH-ETHYLFLURAZ UR-MCNC: NEGATIVE NG/ML
OXAZEPAM UR-MCNC: NEGATIVE NG/ML
OXIDANTS UR QL: NEGATIVE MCG/ML
OXYCODONE UR CFM-MCNC: NEGATIVE NG/ML
OXYMORPHONE UR CFM-MCNC: NEGATIVE NG/ML
PCP UR QL: NEGATIVE NG/ML
PH UR: 6.2 [PH] (ref 4.5–9)
PHENTERMINE UR-MCNC: NEGATIVE NG/ML
QUEST 6 ACETYLMORPHINE: NEGATIVE NG/ML
QUEST NOTES AND COMMENTS: ABNORMAL
QUEST ZOLPIDEM: NEGATIVE NG/ML
TEMAZEPAM UR-MCNC: NEGATIVE NG/ML
THC UR QL: NEGATIVE NG/ML
TRAMADOL UR-MCNC: NEGATIVE NG/ML
ZOLPIDEM PHENYL-4-CARB UR CFM-MCNC: NEGATIVE NG/ML

## 2025-07-29 ENCOUNTER — TELEPHONE (OUTPATIENT)
Dept: PRIMARY CARE | Facility: CLINIC | Age: 45
End: 2025-07-29
Payer: COMMERCIAL

## 2025-07-31 ENCOUNTER — TELEPHONE (OUTPATIENT)
Dept: PRIMARY CARE | Facility: CLINIC | Age: 45
End: 2025-07-31
Payer: COMMERCIAL

## 2025-07-31 DIAGNOSIS — F90.9 ATTENTION DEFICIT HYPERACTIVITY DISORDER (ADHD), UNSPECIFIED ADHD TYPE: ICD-10-CM

## 2025-07-31 RX ORDER — DEXTROAMPHETAMINE SACCHARATE, AMPHETAMINE ASPARTATE MONOHYDRATE, DEXTROAMPHETAMINE SULFATE AND AMPHETAMINE SULFATE 7.5; 7.5; 7.5; 7.5 MG/1; MG/1; MG/1; MG/1
30 CAPSULE, EXTENDED RELEASE ORAL EVERY MORNING
Qty: 30 CAPSULE | Refills: 0 | Status: SHIPPED | OUTPATIENT
Start: 2025-07-31 | End: 2025-08-30

## 2025-07-31 NOTE — TELEPHONE ENCOUNTER
Rx Refill Request Telephone Encounter    Name:  Christine Miller  :  [id number]  Medication Name:  adderall  Specific Pharmacy location:  [facility]  Date of last appointment:  25  Date of next appointment:  None  Best number to reach patient:

## 2025-08-01 DIAGNOSIS — K21.9 GASTROESOPHAGEAL REFLUX DISEASE, UNSPECIFIED WHETHER ESOPHAGITIS PRESENT: ICD-10-CM

## 2025-08-01 RX ORDER — PANTOPRAZOLE SODIUM 20 MG/1
20 TABLET, DELAYED RELEASE ORAL DAILY
Qty: 90 TABLET | Refills: 0 | Status: SHIPPED | OUTPATIENT
Start: 2025-08-01

## 2025-08-04 ENCOUNTER — TELEPHONE (OUTPATIENT)
Dept: PRIMARY CARE | Facility: CLINIC | Age: 45
End: 2025-08-04
Payer: COMMERCIAL

## 2025-08-04 RX ORDER — DEXTROAMPHETAMINE SACCHARATE, AMPHETAMINE ASPARTATE MONOHYDRATE, DEXTROAMPHETAMINE SULFATE AND AMPHETAMINE SULFATE 7.5; 7.5; 7.5; 7.5 MG/1; MG/1; MG/1; MG/1
30 CAPSULE, EXTENDED RELEASE ORAL EVERY MORNING
Qty: 30 CAPSULE | Refills: 0 | Status: SHIPPED | OUTPATIENT
Start: 2025-08-04 | End: 2025-09-03

## 2025-08-04 NOTE — TELEPHONE ENCOUNTER
Pharmacy told patient they did not receive script for her Adderall     Please resend    Rx Refill Request Telephone Encounter     Name:  Christine Miller  :  [id number]  Medication Name:  adderall  Specific Pharmacy location:  [facility]  Date of last appointment:  25  Date of next appointment:  None  Best number to reach patient:

## 2025-08-21 ENCOUNTER — HOSPITAL ENCOUNTER (OUTPATIENT)
Dept: RADIOLOGY | Facility: CLINIC | Age: 45
Discharge: HOME | End: 2025-08-21
Payer: COMMERCIAL

## 2025-08-21 ENCOUNTER — OFFICE VISIT (OUTPATIENT)
Dept: ORTHOPEDIC SURGERY | Facility: CLINIC | Age: 45
End: 2025-08-21
Payer: COMMERCIAL

## 2025-08-21 VITALS — HEIGHT: 63 IN | BODY MASS INDEX: 37.39 KG/M2 | WEIGHT: 211 LBS

## 2025-08-21 DIAGNOSIS — M25.561 RIGHT KNEE PAIN, UNSPECIFIED CHRONICITY: ICD-10-CM

## 2025-08-21 DIAGNOSIS — M17.11 OSTEOARTHRITIS OF RIGHT KNEE, UNSPECIFIED OSTEOARTHRITIS TYPE: ICD-10-CM

## 2025-08-21 PROCEDURE — 73564 X-RAY EXAM KNEE 4 OR MORE: CPT | Mod: 50

## 2025-08-21 PROCEDURE — 99202 OFFICE O/P NEW SF 15 MIN: CPT | Mod: 25 | Performed by: ORTHOPAEDIC SURGERY

## 2025-08-21 PROCEDURE — 99204 OFFICE O/P NEW MOD 45 MIN: CPT | Performed by: ORTHOPAEDIC SURGERY

## 2025-08-21 PROCEDURE — 20610 DRAIN/INJ JOINT/BURSA W/O US: CPT | Mod: RT | Performed by: ORTHOPAEDIC SURGERY

## 2025-08-21 PROCEDURE — 3008F BODY MASS INDEX DOCD: CPT | Performed by: ORTHOPAEDIC SURGERY

## 2025-08-21 PROCEDURE — 1036F TOBACCO NON-USER: CPT | Performed by: ORTHOPAEDIC SURGERY

## 2025-08-21 PROCEDURE — 2500000004 HC RX 250 GENERAL PHARMACY W/ HCPCS (ALT 636 FOR OP/ED): Performed by: ORTHOPAEDIC SURGERY

## 2025-08-21 RX ORDER — MELOXICAM 15 MG/1
15 TABLET ORAL DAILY
Qty: 14 TABLET | Refills: 0 | Status: SHIPPED | OUTPATIENT
Start: 2025-08-21 | End: 2025-09-04

## 2025-08-21 RX ORDER — LIDOCAINE HYDROCHLORIDE 10 MG/ML
1 INJECTION, SOLUTION INFILTRATION; PERINEURAL
Status: COMPLETED | OUTPATIENT
Start: 2025-08-21 | End: 2025-08-21

## 2025-08-21 RX ORDER — BETAMETHASONE SODIUM PHOSPHATE AND BETAMETHASONE ACETATE 3; 3 MG/ML; MG/ML
1 INJECTION, SUSPENSION INTRA-ARTICULAR; INTRALESIONAL; INTRAMUSCULAR; SOFT TISSUE
Status: COMPLETED | OUTPATIENT
Start: 2025-08-21 | End: 2025-08-21

## 2025-08-21 RX ADMIN — LIDOCAINE HYDROCHLORIDE 1 ML: 10 INJECTION, SOLUTION INFILTRATION; PERINEURAL at 10:28

## 2025-08-21 RX ADMIN — BETAMETHASONE SODIUM PHOSPHATE AND BETAMETHASONE ACETATE 1 ML: 3; 3 INJECTION, SUSPENSION INTRA-ARTICULAR; INTRALESIONAL; INTRAMUSCULAR at 10:28

## 2025-08-22 ENCOUNTER — HOSPITAL ENCOUNTER (OUTPATIENT)
Dept: RADIOLOGY | Facility: CLINIC | Age: 45
Discharge: HOME | End: 2025-08-22
Payer: COMMERCIAL

## 2025-08-22 ENCOUNTER — OFFICE VISIT (OUTPATIENT)
Dept: PODIATRY | Facility: CLINIC | Age: 45
End: 2025-08-22
Payer: COMMERCIAL

## 2025-08-22 DIAGNOSIS — M79.675 PAIN IN TOES OF BOTH FEET: Primary | ICD-10-CM

## 2025-08-22 DIAGNOSIS — L84 CALLUS: ICD-10-CM

## 2025-08-22 DIAGNOSIS — M79.674 PAIN IN TOES OF BOTH FEET: Primary | ICD-10-CM

## 2025-08-22 DIAGNOSIS — M79.674 PAIN OF TOE OF RIGHT FOOT: ICD-10-CM

## 2025-08-22 DIAGNOSIS — M20.5X1 ACQUIRED ADDUCTOVARUS ROTATION OF TOE OF RIGHT FOOT: ICD-10-CM

## 2025-08-22 PROCEDURE — 99214 OFFICE O/P EST MOD 30 MIN: CPT | Performed by: PODIATRIST

## 2025-08-22 PROCEDURE — 1036F TOBACCO NON-USER: CPT | Performed by: PODIATRIST

## 2025-08-22 PROCEDURE — 73630 X-RAY EXAM OF FOOT: CPT | Mod: RT

## 2025-08-27 ENCOUNTER — APPOINTMENT (OUTPATIENT)
Dept: ORTHOPEDIC SURGERY | Facility: CLINIC | Age: 45
End: 2025-08-27
Payer: COMMERCIAL

## 2025-09-05 DIAGNOSIS — Z30.41 ORAL CONTRACEPTIVE PILL SURVEILLANCE: ICD-10-CM

## 2025-09-05 RX ORDER — DROSPIRENONE AND ETHINYL ESTRADIOL 0.02-3(28)
1 KIT ORAL DAILY
Qty: 84 TABLET | Refills: 0 | Status: SHIPPED | OUTPATIENT
Start: 2025-09-05

## 2025-09-15 ENCOUNTER — APPOINTMENT (OUTPATIENT)
Facility: CLINIC | Age: 45
End: 2025-09-15
Payer: COMMERCIAL